# Patient Record
Sex: FEMALE | Race: BLACK OR AFRICAN AMERICAN | NOT HISPANIC OR LATINO | URBAN - METROPOLITAN AREA
[De-identification: names, ages, dates, MRNs, and addresses within clinical notes are randomized per-mention and may not be internally consistent; named-entity substitution may affect disease eponyms.]

---

## 2020-07-06 ENCOUNTER — OFFICE VISIT (OUTPATIENT)
Dept: URBAN - METROPOLITAN AREA CLINIC 25 | Facility: CLINIC | Age: 75
End: 2020-07-06
Payer: MEDICARE

## 2020-07-06 DIAGNOSIS — E03.9 HYPOTHYROIDISM, UNSPECIFIED TYPE: ICD-10-CM

## 2020-07-06 DIAGNOSIS — R14.0 ABDOMINAL BLOATING: ICD-10-CM

## 2020-07-06 DIAGNOSIS — R10.13 EPIGASTRIC PAIN: ICD-10-CM

## 2020-07-06 DIAGNOSIS — I10 HYPERTENSION, UNSPECIFIED TYPE: ICD-10-CM

## 2020-07-06 PROCEDURE — G8427 DOCREV CUR MEDS BY ELIG CLIN: HCPCS | Performed by: INTERNAL MEDICINE

## 2020-07-06 PROCEDURE — G9906 PT RECV TBCO CESS INTERV: HCPCS | Performed by: INTERNAL MEDICINE

## 2020-07-06 PROCEDURE — 3017F COLORECTAL CA SCREEN DOC REV: CPT | Performed by: INTERNAL MEDICINE

## 2020-07-06 PROCEDURE — 4004F PT TOBACCO SCREEN RCVD TLK: CPT | Performed by: INTERNAL MEDICINE

## 2020-07-06 PROCEDURE — G9902 PT SCRN TBCO AND ID AS USER: HCPCS | Performed by: INTERNAL MEDICINE

## 2020-07-06 PROCEDURE — 82274 ASSAY TEST FOR BLOOD FECAL: CPT | Performed by: INTERNAL MEDICINE

## 2020-07-06 PROCEDURE — G8417 CALC BMI ABV UP PARAM F/U: HCPCS | Performed by: INTERNAL MEDICINE

## 2020-07-06 PROCEDURE — 99204 OFFICE O/P NEW MOD 45 MIN: CPT | Performed by: INTERNAL MEDICINE

## 2020-07-06 RX ORDER — CELECOXIB 200 MG/1
CAPSULE ORAL
Qty: 0 | Refills: 0 | Status: ACTIVE | COMMUNITY
Start: 1900-01-01

## 2020-07-06 RX ORDER — GABAPENTIN 600 MG/1
TAKE 1 TABLET (600 MG) BY ORAL ROUTE 3 TIMES PER DAY TABLET, FILM COATED ORAL
Qty: 0 | Refills: 0 | Status: ACTIVE | COMMUNITY
Start: 1900-01-01

## 2020-07-06 RX ORDER — LEVOTHYROXINE SODIUM 75 UG/1
TABLET ORAL
Qty: 0 | Refills: 0 | Status: ACTIVE | COMMUNITY
Start: 1900-01-01

## 2020-07-06 RX ORDER — DEXLANSOPRAZOLE 60 MG/1
TAKE 1 CAPSULE (60 MG) BY ORAL ROUTE ONCE DAILY CAPSULE, DELAYED RELEASE ORAL 1
Qty: 0 | Refills: 0 | Status: ACTIVE | COMMUNITY
Start: 1900-01-01

## 2020-07-06 RX ORDER — CYCLOSPORINE 0.5 MG/ML
INSTILL 1 DROP INTO AFFECTED EYE(S) BY OPHTHALMIC ROUTE 2 TIMES PER DAY EMULSION OPHTHALMIC 2
Qty: 1 | Refills: 0 | Status: ACTIVE | COMMUNITY
Start: 1900-01-01

## 2020-07-06 RX ORDER — IBUPROFEN 800 MG/1
TAKE 1 TABLET (800 MG) BY ORAL ROUTE 3 TIMES PER DAY WITH FOOD TABLET ORAL
Qty: 0 | Refills: 0 | Status: ACTIVE | COMMUNITY
Start: 1900-01-01

## 2020-07-06 RX ORDER — OXYCODONE AND ACETAMINOPHEN 7.5; 325 MG/1; MG/1
TABLET ORAL
Qty: 0 | Refills: 0 | Status: ACTIVE | COMMUNITY
Start: 1900-01-01

## 2020-07-06 RX ORDER — CARISOPRODOL 350 MG/1
TAKE 1 TABLET (350 MG) BY ORAL ROUTE 3 TIMES PER DAY AND AT BEDTIME TABLET ORAL
Qty: 0 | Refills: 0 | Status: ACTIVE | COMMUNITY
Start: 1900-01-01

## 2020-07-06 RX ORDER — LETROZOLE TABLETS 2.5 MG/1
TAKE 1 TABLET (2.5 MG) BY ORAL ROUTE ONCE DAILY TABLET, FILM COATED ORAL 1
Qty: 0 | Refills: 0 | Status: ACTIVE | COMMUNITY
Start: 1900-01-01

## 2020-07-06 RX ORDER — NAPROXEN 500 MG/1
TAKE 1 TABLET (500 MG) BY ORAL ROUTE 2 TIMES PER DAY WITH FOOD TABLET ORAL 2
Qty: 0 | Refills: 0 | Status: ACTIVE | COMMUNITY
Start: 1900-01-01

## 2020-07-06 RX ORDER — LEVOTHYROXINE SODIUM 0.09 MG/1
TAKE 1 TABLET (88 MCG) BY ORAL ROUTE ONCE DAILY TABLET ORAL 1
Qty: 0 | Refills: 0 | Status: ACTIVE | COMMUNITY
Start: 1900-01-01

## 2020-07-06 RX ORDER — ERGOCALCIFEROL 1.25 MG/1
CAPSULE ORAL
Qty: 0 | Refills: 0 | Status: ACTIVE | COMMUNITY
Start: 1900-01-01

## 2020-07-06 RX ORDER — AMLODIPINE BESYLATE 5 MG/1
TAKE 1 TABLET (5 MG) BY ORAL ROUTE ONCE DAILY TABLET ORAL 1
Qty: 0 | Refills: 0 | Status: ACTIVE | COMMUNITY
Start: 1900-01-01

## 2020-07-06 RX ORDER — FENTANYL 75 UG/H
APPLY 1 PATCH (75 MCG/HOUR) BY TRANSDERMAL ROUTE EVERY 72 HOURS PATCH, EXTENDED RELEASE TRANSDERMAL
Qty: 0 | Refills: 0 | Status: ACTIVE | COMMUNITY
Start: 1900-01-01

## 2020-07-06 NOTE — PHYSICAL EXAM CHEST:
no lesions,  no deformities,  no traumatic injuries,  no significant scars are present,  chest wall non-tender,  no masses present,  tactile fremitus is normal, breathing is unlabored without accessory muscle use,normal breath sounds
Warm

## 2020-07-08 ENCOUNTER — OFFICE VISIT (OUTPATIENT)
Dept: URBAN - METROPOLITAN AREA CLINIC 15 | Facility: CLINIC | Age: 75
End: 2020-07-08

## 2020-07-14 LAB
AMYLASE: 118
LIPASE: 74

## 2020-07-30 ENCOUNTER — OFFICE VISIT (OUTPATIENT)
Dept: URBAN - METROPOLITAN AREA SURGERY CENTER 16 | Facility: SURGERY CENTER | Age: 75
End: 2020-07-30
Payer: MEDICARE

## 2020-07-30 ENCOUNTER — CLAIMS CREATED FROM THE CLAIM WINDOW (OUTPATIENT)
Dept: URBAN - METROPOLITAN AREA CLINIC 4 | Facility: CLINIC | Age: 75
End: 2020-07-30
Payer: MEDICARE

## 2020-07-30 DIAGNOSIS — T47.8X5A ADVERSE EFFECT OF OTHER AGENTS PRIMARILY AFFECTING GASTROINTESTINAL SYSTEM, INITIAL ENCOUNTER: ICD-10-CM

## 2020-07-30 DIAGNOSIS — K31.7 BENIGN GASTRIC POLYP: ICD-10-CM

## 2020-07-30 PROCEDURE — 88305 TISSUE EXAM BY PATHOLOGIST: CPT | Performed by: PATHOLOGY

## 2020-07-30 PROCEDURE — G8907 PT DOC NO EVENTS ON DISCHARG: HCPCS | Performed by: INTERNAL MEDICINE

## 2020-07-30 PROCEDURE — 88312 SPECIAL STAINS GROUP 1: CPT | Performed by: PATHOLOGY

## 2020-07-30 PROCEDURE — 43239 EGD BIOPSY SINGLE/MULTIPLE: CPT | Performed by: INTERNAL MEDICINE

## 2020-07-30 RX ORDER — CYCLOSPORINE 0.5 MG/ML
INSTILL 1 DROP INTO AFFECTED EYE(S) BY OPHTHALMIC ROUTE 2 TIMES PER DAY EMULSION OPHTHALMIC 2
Qty: 1 | Refills: 0 | Status: ACTIVE | COMMUNITY
Start: 1900-01-01 | End: 1900-01-01

## 2020-07-30 RX ORDER — IBUPROFEN 800 MG/1
TAKE 1 TABLET (800 MG) BY ORAL ROUTE 3 TIMES PER DAY WITH FOOD TABLET ORAL
Qty: 0 | Refills: 0 | Status: ACTIVE | COMMUNITY
Start: 1900-01-01 | End: 1900-01-01

## 2020-07-30 RX ORDER — ERGOCALCIFEROL 1.25 MG/1
CAPSULE ORAL
Qty: 0 | Refills: 0 | Status: ACTIVE | COMMUNITY
Start: 1900-01-01 | End: 1900-01-01

## 2020-07-30 RX ORDER — FENTANYL 75 UG/H
APPLY 1 PATCH (75 MCG/HOUR) BY TRANSDERMAL ROUTE EVERY 72 HOURS PATCH, EXTENDED RELEASE TRANSDERMAL
Qty: 0 | Refills: 0 | Status: ACTIVE | COMMUNITY
Start: 1900-01-01 | End: 1900-01-01

## 2020-07-30 RX ORDER — NAPROXEN 500 MG/1
TAKE 1 TABLET (500 MG) BY ORAL ROUTE 2 TIMES PER DAY WITH FOOD TABLET ORAL 2
Qty: 0 | Refills: 0 | Status: ACTIVE | COMMUNITY
Start: 1900-01-01 | End: 1900-01-01

## 2020-07-30 RX ORDER — OXYCODONE AND ACETAMINOPHEN 7.5; 325 MG/1; MG/1
TABLET ORAL
Qty: 0 | Refills: 0 | Status: ACTIVE | COMMUNITY
Start: 1900-01-01 | End: 1900-01-01

## 2020-07-30 RX ORDER — DEXLANSOPRAZOLE 60 MG/1
TAKE 1 CAPSULE (60 MG) BY ORAL ROUTE ONCE DAILY CAPSULE, DELAYED RELEASE ORAL 1
Qty: 0 | Refills: 0 | Status: ACTIVE | COMMUNITY
Start: 1900-01-01 | End: 1900-01-01

## 2020-07-30 RX ORDER — AMLODIPINE BESYLATE 5 MG/1
TAKE 1 TABLET (5 MG) BY ORAL ROUTE ONCE DAILY TABLET ORAL 1
Qty: 0 | Refills: 0 | Status: ACTIVE | COMMUNITY
Start: 1900-01-01 | End: 1900-01-01

## 2020-07-30 RX ORDER — LEVOTHYROXINE SODIUM 75 UG/1
TABLET ORAL
Qty: 0 | Refills: 0 | Status: ACTIVE | COMMUNITY
Start: 1900-01-01 | End: 1900-01-01

## 2020-07-30 RX ORDER — CELECOXIB 200 MG/1
CAPSULE ORAL
Qty: 0 | Refills: 0 | Status: ACTIVE | COMMUNITY
Start: 1900-01-01 | End: 1900-01-01

## 2020-07-30 RX ORDER — GABAPENTIN 600 MG/1
TAKE 1 TABLET (600 MG) BY ORAL ROUTE 3 TIMES PER DAY TABLET, FILM COATED ORAL
Qty: 0 | Refills: 0 | Status: ACTIVE | COMMUNITY
Start: 1900-01-01 | End: 1900-01-01

## 2020-07-30 RX ORDER — LEVOTHYROXINE SODIUM 0.09 MG/1
TAKE 1 TABLET (88 MCG) BY ORAL ROUTE ONCE DAILY TABLET ORAL 1
Qty: 0 | Refills: 0 | Status: ACTIVE | COMMUNITY
Start: 1900-01-01 | End: 1900-01-01

## 2020-07-30 RX ORDER — LETROZOLE TABLETS 2.5 MG/1
TAKE 1 TABLET (2.5 MG) BY ORAL ROUTE ONCE DAILY TABLET, FILM COATED ORAL 1
Qty: 0 | Refills: 0 | Status: ACTIVE | COMMUNITY
Start: 1900-01-01 | End: 1900-01-01

## 2020-07-30 RX ORDER — CARISOPRODOL 350 MG/1
TAKE 1 TABLET (350 MG) BY ORAL ROUTE 3 TIMES PER DAY AND AT BEDTIME TABLET ORAL
Qty: 0 | Refills: 0 | Status: ACTIVE | COMMUNITY
Start: 1900-01-01 | End: 1900-01-01

## 2021-06-02 PROBLEM — 162864005: Status: ACTIVE | Noted: 2021-06-02

## 2021-06-02 PROBLEM — 414916001: Status: ACTIVE | Noted: 2021-06-02

## 2021-08-05 ENCOUNTER — LAB OUTSIDE AN ENCOUNTER (OUTPATIENT)
Dept: URBAN - METROPOLITAN AREA CLINIC 92 | Facility: CLINIC | Age: 76
End: 2021-08-05

## 2021-08-05 ENCOUNTER — OFFICE VISIT (OUTPATIENT)
Dept: URBAN - METROPOLITAN AREA CLINIC 92 | Facility: CLINIC | Age: 76
End: 2021-08-05
Payer: MEDICARE

## 2021-08-05 VITALS
HEART RATE: 85 BPM | SYSTOLIC BLOOD PRESSURE: 143 MMHG | DIASTOLIC BLOOD PRESSURE: 75 MMHG | HEIGHT: 61 IN | WEIGHT: 176 LBS | BODY MASS INDEX: 33.23 KG/M2 | TEMPERATURE: 98 F

## 2021-08-05 DIAGNOSIS — Z80.0 FAMILY HISTORY OF COLON CANCER: ICD-10-CM

## 2021-08-05 DIAGNOSIS — I25.10 CORONARY ARTERY DISEASE INVOLVING NATIVE CORONARY ARTERY OF NATIVE HEART WITHOUT ANGINA PECTORIS: ICD-10-CM

## 2021-08-05 DIAGNOSIS — R19.7 DIARRHEA, UNSPECIFIED TYPE: ICD-10-CM

## 2021-08-05 DIAGNOSIS — R12 HEARTBURN: ICD-10-CM

## 2021-08-05 DIAGNOSIS — K21.9 GASTROESOPHAGEAL REFLUX DISEASE, UNSPECIFIED WHETHER ESOPHAGITIS PRESENT: ICD-10-CM

## 2021-08-05 DIAGNOSIS — R11.0 NAUSEA: ICD-10-CM

## 2021-08-05 PROBLEM — 443502000: Status: ACTIVE | Noted: 2021-08-05

## 2021-08-05 PROCEDURE — 99204 OFFICE O/P NEW MOD 45 MIN: CPT | Performed by: INTERNAL MEDICINE

## 2021-08-05 RX ORDER — AMLODIPINE BESYLATE 5 MG/1
TAKE 1 TABLET (5 MG) BY ORAL ROUTE ONCE DAILY TABLET ORAL 1
Qty: 0 | Refills: 0 | Status: ACTIVE | COMMUNITY
Start: 1900-01-01

## 2021-08-05 RX ORDER — FENTANYL 75 UG/H
APPLY 1 PATCH (75 MCG/HOUR) BY TRANSDERMAL ROUTE EVERY 72 HOURS PATCH, EXTENDED RELEASE TRANSDERMAL
Qty: 0 | Refills: 0 | Status: ACTIVE | COMMUNITY
Start: 1900-01-01

## 2021-08-05 RX ORDER — ONDANSETRON HYDROCHLORIDE 4 MG/1
1 TABLET AS NEEDED TABLET, FILM COATED ORAL ONCE A DAY
Qty: 10 TABLET | Refills: 0 | OUTPATIENT
Start: 2021-08-05

## 2021-08-05 RX ORDER — NAPROXEN 500 MG/1
TAKE 1 TABLET (500 MG) BY ORAL ROUTE 2 TIMES PER DAY WITH FOOD TABLET ORAL 2
Qty: 0 | Refills: 0 | Status: ACTIVE | COMMUNITY
Start: 1900-01-01

## 2021-08-05 RX ORDER — LEVOTHYROXINE SODIUM 75 UG/1
TABLET ORAL
Qty: 0 | Refills: 0 | Status: ACTIVE | COMMUNITY
Start: 1900-01-01

## 2021-08-05 RX ORDER — LETROZOLE TABLETS 2.5 MG/1
TAKE 1 TABLET (2.5 MG) BY ORAL ROUTE ONCE DAILY TABLET, FILM COATED ORAL 1
Qty: 0 | Refills: 0 | Status: ACTIVE | COMMUNITY
Start: 1900-01-01

## 2021-08-05 RX ORDER — CYCLOSPORINE 0.5 MG/ML
INSTILL 1 DROP INTO AFFECTED EYE(S) BY OPHTHALMIC ROUTE 2 TIMES PER DAY EMULSION OPHTHALMIC 2
Qty: 1 | Refills: 0 | Status: ACTIVE | COMMUNITY
Start: 1900-01-01

## 2021-08-05 RX ORDER — CELECOXIB 200 MG/1
CAPSULE ORAL
Qty: 0 | Refills: 0 | Status: ACTIVE | COMMUNITY
Start: 1900-01-01

## 2021-08-05 RX ORDER — OXYCODONE AND ACETAMINOPHEN 7.5; 325 MG/1; MG/1
TABLET ORAL
Qty: 0 | Refills: 0 | Status: ACTIVE | COMMUNITY
Start: 1900-01-01

## 2021-08-05 RX ORDER — IBUPROFEN 800 MG/1
TAKE 1 TABLET (800 MG) BY ORAL ROUTE 3 TIMES PER DAY WITH FOOD TABLET ORAL
Qty: 0 | Refills: 0 | Status: ACTIVE | COMMUNITY
Start: 1900-01-01

## 2021-08-05 RX ORDER — LEVOTHYROXINE SODIUM 0.09 MG/1
TAKE 1 TABLET (88 MCG) BY ORAL ROUTE ONCE DAILY TABLET ORAL 1
Qty: 0 | Refills: 0 | Status: ACTIVE | COMMUNITY
Start: 1900-01-01

## 2021-08-05 RX ORDER — CARISOPRODOL 350 MG/1
TAKE 1 TABLET (350 MG) BY ORAL ROUTE 3 TIMES PER DAY AND AT BEDTIME TABLET ORAL
Qty: 0 | Refills: 0 | Status: ACTIVE | COMMUNITY
Start: 1900-01-01

## 2021-08-05 RX ORDER — OMEPRAZOLE 40 MG/1
1 CAPSULE 30 MINUTES BEFORE MORNING MEAL CAPSULE, DELAYED RELEASE ORAL ONCE A DAY
Qty: 30 | Refills: 0 | OUTPATIENT
Start: 2021-08-05

## 2021-08-05 RX ORDER — ERGOCALCIFEROL 1.25 MG/1
CAPSULE ORAL
Qty: 0 | Refills: 0 | Status: ACTIVE | COMMUNITY
Start: 1900-01-01

## 2021-08-05 RX ORDER — GABAPENTIN 600 MG/1
TAKE 1 TABLET (600 MG) BY ORAL ROUTE 3 TIMES PER DAY TABLET, FILM COATED ORAL
Qty: 0 | Refills: 0 | Status: ACTIVE | COMMUNITY
Start: 1900-01-01

## 2021-08-05 NOTE — HPI-TODAY'S VISIT:
This is a 76-year-old female referred by Dr. Ben Bautista for a GI eval of diarrhea, heartburn and nausea.  Upon review of the chart, patient has been seeing my partner Dr. Kenneth Martínez and last saw him on July 6 2020 complaining of abdominal pain in the epigastrium with some bloating a few times a week.  At that time patient denied dysphagia.  Dr. Martínez ordered an upper endoscopy and this was done on July 30, 2020 and revealed a normal-appearing esophagus, some mild gastric erythema.  Biopsies were taken and revealed fundic gland polyp of the stomach but no H. pylori or other abnormality seen of the stomach.  Previous to this she had an EGD with similar findings in 2018 but due to dysphagia Dr. Martínez did a [place] dilation with 46 Bulgarian dilator empirically.  Patient had a colonoscopy on April 27, 2017 with Dr. Martínez this revealed internal hemorrhoids as well as 2 polyps that were removed.  Dr. Martínez did biopsies to rule out colitis and this was negative.  The polyps were adenomas.  He recommended a repeat colonoscopy in 5 years or May 2022.  It appears that patient has had a CT scan done for abdominal pain in 2013 that showed a uterine fibroid. Pt could not get in to see Dr Martínez so made an appt to see me. Pt still has upper abd discomfort like she did last July. Pt says she ate some "fake hamburger" a week ago and started to have some diarrhea since then. Pt is going every times she eats, no appetite. NO temps. NO recent travel. Pt had an ingrown toenail so she took some abx then. Pt had cardiac stent in 2019- did not have an MI. Recently had cp so went to ER and saw  her cardiologist and heart was fine, they told her to see GI for GERD. Pt has not been on her PPI.

## 2021-08-06 ENCOUNTER — LAB OUTSIDE AN ENCOUNTER (OUTPATIENT)
Dept: URBAN - METROPOLITAN AREA CLINIC 96 | Facility: CLINIC | Age: 76
End: 2021-08-06

## 2021-08-11 LAB — GASTROINTESTINAL PATHOGEN: (no result)

## 2021-08-12 ENCOUNTER — TELEPHONE ENCOUNTER (OUTPATIENT)
Dept: URBAN - METROPOLITAN AREA CLINIC 92 | Facility: CLINIC | Age: 76
End: 2021-08-12

## 2021-08-12 RX ORDER — AZITHROMYCIN MONOHYDRATE 500 MG/1
1 TABLET TABLET, FILM COATED ORAL ONCE A DAY
Qty: 3 TABLET | Refills: 0 | OUTPATIENT
Start: 2021-08-12 | End: 2021-08-15

## 2021-09-13 ENCOUNTER — OFFICE VISIT (OUTPATIENT)
Dept: URBAN - METROPOLITAN AREA CLINIC 25 | Facility: CLINIC | Age: 76
End: 2021-09-13
Payer: MEDICARE

## 2021-09-13 VITALS
WEIGHT: 178 LBS | SYSTOLIC BLOOD PRESSURE: 118 MMHG | DIASTOLIC BLOOD PRESSURE: 60 MMHG | HEART RATE: 78 BPM | BODY MASS INDEX: 33.61 KG/M2 | HEIGHT: 61 IN | TEMPERATURE: 97.7 F

## 2021-09-13 DIAGNOSIS — E03.9 HYPOTHYROIDISM, UNSPECIFIED TYPE: ICD-10-CM

## 2021-09-13 DIAGNOSIS — R19.4 CHANGE IN BOWEL HABITS: ICD-10-CM

## 2021-09-13 DIAGNOSIS — Z86.010 HISTORY OF COLONIC POLYPS: ICD-10-CM

## 2021-09-13 DIAGNOSIS — Z85.3 HISTORY OF BREAST CANCER: ICD-10-CM

## 2021-09-13 DIAGNOSIS — R19.7 DIARRHEA, UNSPECIFIED TYPE: ICD-10-CM

## 2021-09-13 DIAGNOSIS — E66.9 OBESITY (BMI 30-39.9): ICD-10-CM

## 2021-09-13 DIAGNOSIS — I10 HYPERTENSION, UNSPECIFIED TYPE: ICD-10-CM

## 2021-09-13 DIAGNOSIS — R10.84 GENERALIZED ABDOMINAL PAIN: ICD-10-CM

## 2021-09-13 PROBLEM — 429087003: Status: ACTIVE | Noted: 2021-09-13

## 2021-09-13 PROBLEM — 428283002: Status: ACTIVE | Noted: 2021-09-13

## 2021-09-13 PROCEDURE — 99204 OFFICE O/P NEW MOD 45 MIN: CPT | Performed by: INTERNAL MEDICINE

## 2021-09-13 RX ORDER — CYCLOSPORINE 0.5 MG/ML
INSTILL 1 DROP INTO AFFECTED EYE(S) BY OPHTHALMIC ROUTE 2 TIMES PER DAY EMULSION OPHTHALMIC 2
Qty: 1 | Refills: 0 | Status: ON HOLD | COMMUNITY
Start: 1900-01-01

## 2021-09-13 RX ORDER — AMLODIPINE BESYLATE 5 MG/1
TAKE 1 TABLET (5 MG) BY ORAL ROUTE ONCE DAILY TABLET ORAL 1
Qty: 0 | Refills: 0 | Status: ACTIVE | COMMUNITY
Start: 1900-01-01

## 2021-09-13 RX ORDER — GABAPENTIN 600 MG/1
TAKE 1 TABLET (600 MG) BY ORAL ROUTE 3 TIMES PER DAY TABLET, FILM COATED ORAL
Qty: 0 | Refills: 0 | Status: ACTIVE | COMMUNITY
Start: 1900-01-01

## 2021-09-13 RX ORDER — NAPROXEN 500 MG/1
TAKE 1 TABLET (500 MG) BY ORAL ROUTE 2 TIMES PER DAY WITH FOOD TABLET ORAL 2
Qty: 0 | Refills: 0 | Status: ACTIVE | COMMUNITY
Start: 1900-01-01

## 2021-09-13 RX ORDER — LEVOTHYROXINE SODIUM 75 UG/1
TABLET ORAL
Qty: 0 | Refills: 0 | Status: ON HOLD | COMMUNITY
Start: 1900-01-01

## 2021-09-13 RX ORDER — ERGOCALCIFEROL 1.25 MG/1
CAPSULE ORAL
Qty: 0 | Refills: 0 | Status: ON HOLD | COMMUNITY
Start: 1900-01-01

## 2021-09-13 RX ORDER — CELECOXIB 200 MG/1
CAPSULE ORAL
Qty: 0 | Refills: 0 | Status: ACTIVE | COMMUNITY
Start: 1900-01-01

## 2021-09-13 RX ORDER — IBUPROFEN 800 MG/1
TAKE 1 TABLET (800 MG) BY ORAL ROUTE 3 TIMES PER DAY WITH FOOD TABLET ORAL
Qty: 0 | Refills: 0 | Status: ACTIVE | COMMUNITY
Start: 1900-01-01

## 2021-09-13 RX ORDER — FENTANYL 75 UG/H
APPLY 1 PATCH (75 MCG/HOUR) BY TRANSDERMAL ROUTE EVERY 72 HOURS PATCH, EXTENDED RELEASE TRANSDERMAL
Qty: 0 | Refills: 0 | Status: ACTIVE | COMMUNITY
Start: 1900-01-01

## 2021-09-13 RX ORDER — LEVOTHYROXINE SODIUM 0.09 MG/1
TAKE 1 TABLET (88 MCG) BY ORAL ROUTE ONCE DAILY TABLET ORAL 1
Qty: 0 | Refills: 0 | Status: ACTIVE | COMMUNITY
Start: 1900-01-01

## 2021-09-13 RX ORDER — OXYCODONE AND ACETAMINOPHEN 7.5; 325 MG/1; MG/1
TABLET ORAL
Qty: 0 | Refills: 0 | Status: ON HOLD | COMMUNITY
Start: 1900-01-01

## 2021-09-13 RX ORDER — ONDANSETRON HYDROCHLORIDE 4 MG/1
1 TABLET AS NEEDED TABLET, FILM COATED ORAL ONCE A DAY
Qty: 10 TABLET | Refills: 0 | Status: ACTIVE | COMMUNITY
Start: 2021-08-05

## 2021-09-13 RX ORDER — OMEPRAZOLE 40 MG/1
1 CAPSULE 30 MINUTES BEFORE MORNING MEAL CAPSULE, DELAYED RELEASE ORAL ONCE A DAY
Qty: 30 | Refills: 0 | Status: ON HOLD | COMMUNITY
Start: 2021-08-05

## 2021-09-13 RX ORDER — LETROZOLE TABLETS 2.5 MG/1
TAKE 1 TABLET (2.5 MG) BY ORAL ROUTE ONCE DAILY TABLET, FILM COATED ORAL 1
Qty: 0 | Refills: 0 | Status: ACTIVE | COMMUNITY
Start: 1900-01-01

## 2021-09-13 RX ORDER — CARISOPRODOL 350 MG/1
TAKE 1 TABLET (350 MG) BY ORAL ROUTE 3 TIMES PER DAY AND AT BEDTIME TABLET ORAL
Qty: 0 | Refills: 0 | Status: ACTIVE | COMMUNITY
Start: 1900-01-01

## 2021-09-21 PROBLEM — 62315008: Status: ACTIVE | Noted: 2021-09-21

## 2021-09-23 ENCOUNTER — OFFICE VISIT (OUTPATIENT)
Dept: URBAN - METROPOLITAN AREA SURGERY CENTER 16 | Facility: SURGERY CENTER | Age: 76
End: 2021-09-23

## 2021-10-28 ENCOUNTER — OFFICE VISIT (OUTPATIENT)
Dept: URBAN - METROPOLITAN AREA SURGERY CENTER 16 | Facility: SURGERY CENTER | Age: 76
End: 2021-10-28
Payer: MEDICARE

## 2021-10-28 DIAGNOSIS — R10.84 ABDOMINAL CRAMPING, GENERALIZED: ICD-10-CM

## 2021-10-28 DIAGNOSIS — R19.4 ALTERATION IN BOWEL ELIMINATION: ICD-10-CM

## 2021-10-28 PROCEDURE — 45378 DIAGNOSTIC COLONOSCOPY: CPT | Performed by: INTERNAL MEDICINE

## 2021-10-28 PROCEDURE — G8907 PT DOC NO EVENTS ON DISCHARG: HCPCS | Performed by: INTERNAL MEDICINE

## 2022-03-21 ENCOUNTER — OFFICE VISIT (OUTPATIENT)
Dept: URBAN - METROPOLITAN AREA CLINIC 25 | Facility: CLINIC | Age: 77
End: 2022-03-21
Payer: MEDICARE

## 2022-03-21 VITALS
WEIGHT: 178.6 LBS | TEMPERATURE: 97 F | HEART RATE: 60 BPM | SYSTOLIC BLOOD PRESSURE: 121 MMHG | DIASTOLIC BLOOD PRESSURE: 69 MMHG | BODY MASS INDEX: 33.72 KG/M2 | HEIGHT: 61 IN

## 2022-03-21 DIAGNOSIS — Z86.010 HX OF COLONIC POLYPS: ICD-10-CM

## 2022-03-21 DIAGNOSIS — K59.1 FUNCTIONAL DIARRHEA: ICD-10-CM

## 2022-03-21 DIAGNOSIS — E66.9 OBESITY (BMI 30-39.9): ICD-10-CM

## 2022-03-21 DIAGNOSIS — E03.9 HYPOTHYROIDISM, UNSPECIFIED TYPE: ICD-10-CM

## 2022-03-21 DIAGNOSIS — R11.2 NAUSEA AND VOMITING, UNSPECIFIED VOMITING TYPE: ICD-10-CM

## 2022-03-21 DIAGNOSIS — I10 HYPERTENSION, UNSPECIFIED TYPE: ICD-10-CM

## 2022-03-21 DIAGNOSIS — R13.10 DYSPHAGIA, UNSPECIFIED TYPE: ICD-10-CM

## 2022-03-21 DIAGNOSIS — R10.84 GENERALIZED ABDOMINAL PAIN: ICD-10-CM

## 2022-03-21 PROBLEM — 162864005: Status: ACTIVE | Noted: 2021-09-13

## 2022-03-21 PROBLEM — 40739000: Status: ACTIVE | Noted: 2022-03-21

## 2022-03-21 PROBLEM — 38341003: Status: ACTIVE | Noted: 2021-09-13

## 2022-03-21 PROBLEM — 428283002: Status: ACTIVE | Noted: 2022-03-21

## 2022-03-21 PROBLEM — 40930008: Status: ACTIVE | Noted: 2021-09-13

## 2022-03-21 PROCEDURE — 99213 OFFICE O/P EST LOW 20 MIN: CPT | Performed by: INTERNAL MEDICINE

## 2022-03-21 RX ORDER — AMLODIPINE BESYLATE 5 MG/1
TAKE 1 TABLET (5 MG) BY ORAL ROUTE ONCE DAILY TABLET ORAL 1
Qty: 0 | Refills: 0 | Status: ACTIVE | COMMUNITY
Start: 1900-01-01

## 2022-03-21 RX ORDER — ERGOCALCIFEROL 1.25 MG/1
CAPSULE ORAL
Qty: 0 | Refills: 0 | Status: ON HOLD | COMMUNITY
Start: 1900-01-01

## 2022-03-21 RX ORDER — IBUPROFEN 800 MG/1
TAKE 1 TABLET (800 MG) BY ORAL ROUTE 3 TIMES PER DAY WITH FOOD TABLET ORAL
Qty: 0 | Refills: 0 | Status: ACTIVE | COMMUNITY
Start: 1900-01-01

## 2022-03-21 RX ORDER — GABAPENTIN 600 MG/1
TAKE 1 TABLET (600 MG) BY ORAL ROUTE 3 TIMES PER DAY TABLET, FILM COATED ORAL
Qty: 0 | Refills: 0 | Status: ACTIVE | COMMUNITY
Start: 1900-01-01

## 2022-03-21 RX ORDER — OMEPRAZOLE 40 MG/1
1 CAPSULE 30 MINUTES BEFORE MORNING MEAL CAPSULE, DELAYED RELEASE ORAL ONCE A DAY
Qty: 30 | Refills: 0 | Status: ON HOLD | COMMUNITY
Start: 2021-08-05

## 2022-03-21 RX ORDER — LETROZOLE TABLETS 2.5 MG/1
TAKE 1 TABLET (2.5 MG) BY ORAL ROUTE ONCE DAILY TABLET, FILM COATED ORAL 1
Qty: 0 | Refills: 0 | Status: ACTIVE | COMMUNITY
Start: 1900-01-01

## 2022-03-21 RX ORDER — LEVOTHYROXINE SODIUM 75 UG/1
TABLET ORAL
Qty: 0 | Refills: 0 | Status: ON HOLD | COMMUNITY
Start: 1900-01-01

## 2022-03-21 RX ORDER — NAPROXEN 500 MG/1
TAKE 1 TABLET (500 MG) BY ORAL ROUTE 2 TIMES PER DAY WITH FOOD TABLET ORAL 2
Qty: 0 | Refills: 0 | Status: ACTIVE | COMMUNITY
Start: 1900-01-01

## 2022-03-21 RX ORDER — OXYCODONE AND ACETAMINOPHEN 7.5; 325 MG/1; MG/1
TABLET ORAL
Qty: 0 | Refills: 0 | Status: ON HOLD | COMMUNITY
Start: 1900-01-01

## 2022-03-21 RX ORDER — CYCLOSPORINE 0.5 MG/ML
INSTILL 1 DROP INTO AFFECTED EYE(S) BY OPHTHALMIC ROUTE 2 TIMES PER DAY EMULSION OPHTHALMIC 2
Qty: 1 | Refills: 0 | Status: ON HOLD | COMMUNITY
Start: 1900-01-01

## 2022-03-21 RX ORDER — CELECOXIB 200 MG/1
CAPSULE ORAL
Qty: 0 | Refills: 0 | Status: ACTIVE | COMMUNITY
Start: 1900-01-01

## 2022-03-21 RX ORDER — LEVOTHYROXINE SODIUM 0.09 MG/1
TAKE 1 TABLET (88 MCG) BY ORAL ROUTE ONCE DAILY TABLET ORAL 1
Qty: 0 | Refills: 0 | Status: ACTIVE | COMMUNITY
Start: 1900-01-01

## 2022-03-21 RX ORDER — FENTANYL 75 UG/H
APPLY 1 PATCH (75 MCG/HOUR) BY TRANSDERMAL ROUTE EVERY 72 HOURS PATCH, EXTENDED RELEASE TRANSDERMAL
Qty: 0 | Refills: 0 | Status: ACTIVE | COMMUNITY
Start: 1900-01-01

## 2022-03-21 RX ORDER — ONDANSETRON HYDROCHLORIDE 4 MG/1
1 TABLET AS NEEDED TABLET, FILM COATED ORAL ONCE A DAY
Qty: 10 TABLET | Refills: 0 | Status: ACTIVE | COMMUNITY
Start: 2021-08-05

## 2022-03-21 RX ORDER — CARISOPRODOL 350 MG/1
TAKE 1 TABLET (350 MG) BY ORAL ROUTE 3 TIMES PER DAY AND AT BEDTIME TABLET ORAL
Qty: 0 | Refills: 0 | Status: ACTIVE | COMMUNITY
Start: 1900-01-01

## 2022-04-15 ENCOUNTER — OFFICE VISIT (OUTPATIENT)
Dept: URBAN - METROPOLITAN AREA CLINIC 17 | Facility: CLINIC | Age: 77
End: 2022-04-15

## 2023-05-18 ENCOUNTER — OFFICE VISIT (OUTPATIENT)
Dept: URBAN - METROPOLITAN AREA CLINIC 92 | Facility: CLINIC | Age: 78
End: 2023-05-18
Payer: MEDICARE

## 2023-05-18 ENCOUNTER — WEB ENCOUNTER (OUTPATIENT)
Dept: URBAN - METROPOLITAN AREA CLINIC 92 | Facility: CLINIC | Age: 78
End: 2023-05-18

## 2023-05-18 VITALS
TEMPERATURE: 96.8 F | DIASTOLIC BLOOD PRESSURE: 79 MMHG | HEART RATE: 74 BPM | WEIGHT: 152.3 LBS | HEIGHT: 61 IN | BODY MASS INDEX: 28.75 KG/M2 | SYSTOLIC BLOOD PRESSURE: 132 MMHG

## 2023-05-18 DIAGNOSIS — R10.84 GENERALIZED ABDOMINAL PAIN: ICD-10-CM

## 2023-05-18 DIAGNOSIS — R19.7 ACUTE DIARRHEA: ICD-10-CM

## 2023-05-18 PROCEDURE — 99213 OFFICE O/P EST LOW 20 MIN: CPT

## 2023-05-18 RX ORDER — ONDANSETRON HYDROCHLORIDE 4 MG/1
1 TABLET AS NEEDED TABLET, FILM COATED ORAL ONCE A DAY
Qty: 10 TABLET | Refills: 0 | Status: ACTIVE | COMMUNITY
Start: 2021-08-05

## 2023-05-18 RX ORDER — LEVOTHYROXINE SODIUM 75 UG/1
TABLET ORAL
Qty: 0 | Refills: 0 | Status: ON HOLD | COMMUNITY
Start: 1900-01-01

## 2023-05-18 RX ORDER — LEVOTHYROXINE SODIUM 0.09 MG/1
TAKE 1 TABLET (88 MCG) BY ORAL ROUTE ONCE DAILY TABLET ORAL 1
Qty: 0 | Refills: 0 | Status: ACTIVE | COMMUNITY
Start: 1900-01-01

## 2023-05-18 RX ORDER — ERGOCALCIFEROL 1.25 MG/1
CAPSULE ORAL
Qty: 0 | Refills: 0 | Status: ON HOLD | COMMUNITY
Start: 1900-01-01

## 2023-05-18 RX ORDER — FENTANYL 75 UG/H
APPLY 1 PATCH (75 MCG/HOUR) BY TRANSDERMAL ROUTE EVERY 72 HOURS PATCH, EXTENDED RELEASE TRANSDERMAL
Qty: 0 | Refills: 0 | Status: ACTIVE | COMMUNITY
Start: 1900-01-01

## 2023-05-18 RX ORDER — CYCLOSPORINE 0.5 MG/ML
INSTILL 1 DROP INTO AFFECTED EYE(S) BY OPHTHALMIC ROUTE 2 TIMES PER DAY EMULSION OPHTHALMIC 2
Qty: 1 | Refills: 0 | Status: ACTIVE | COMMUNITY
Start: 1900-01-01

## 2023-05-18 RX ORDER — GABAPENTIN 600 MG/1
TAKE 1 TABLET (600 MG) BY ORAL ROUTE 3 TIMES PER DAY TABLET, FILM COATED ORAL
Qty: 0 | Refills: 0 | Status: ACTIVE | COMMUNITY
Start: 1900-01-01

## 2023-05-18 RX ORDER — LETROZOLE TABLETS 2.5 MG/1
TAKE 1 TABLET (2.5 MG) BY ORAL ROUTE ONCE DAILY TABLET, FILM COATED ORAL 1
Qty: 0 | Refills: 0 | Status: ACTIVE | COMMUNITY
Start: 1900-01-01

## 2023-05-18 RX ORDER — IBUPROFEN 800 MG/1
TAKE 1 TABLET (800 MG) BY ORAL ROUTE 3 TIMES PER DAY WITH FOOD TABLET ORAL
Qty: 0 | Refills: 0 | Status: ACTIVE | COMMUNITY
Start: 1900-01-01

## 2023-05-18 RX ORDER — OXYCODONE AND ACETAMINOPHEN 7.5; 325 MG/1; MG/1
TABLET ORAL
Qty: 0 | Refills: 0 | Status: ACTIVE | COMMUNITY
Start: 1900-01-01

## 2023-05-18 RX ORDER — NAPROXEN 500 MG/1
TAKE 1 TABLET (500 MG) BY ORAL ROUTE 2 TIMES PER DAY WITH FOOD TABLET ORAL 2
Qty: 0 | Refills: 0 | Status: ACTIVE | COMMUNITY
Start: 1900-01-01

## 2023-05-18 RX ORDER — CELECOXIB 200 MG/1
CAPSULE ORAL
Qty: 0 | Refills: 0 | Status: ACTIVE | COMMUNITY
Start: 1900-01-01

## 2023-05-18 RX ORDER — CARISOPRODOL 350 MG/1
TAKE 1 TABLET (350 MG) BY ORAL ROUTE 3 TIMES PER DAY AND AT BEDTIME TABLET ORAL
Qty: 0 | Refills: 0 | Status: ACTIVE | COMMUNITY
Start: 1900-01-01

## 2023-05-18 RX ORDER — AMLODIPINE BESYLATE 5 MG/1
TAKE 1 TABLET (5 MG) BY ORAL ROUTE ONCE DAILY TABLET ORAL 1
Qty: 0 | Refills: 0 | Status: ACTIVE | COMMUNITY
Start: 1900-01-01

## 2023-05-18 RX ORDER — OMEPRAZOLE 40 MG/1
1 CAPSULE 30 MINUTES BEFORE MORNING MEAL CAPSULE, DELAYED RELEASE ORAL ONCE A DAY
Qty: 30 | Refills: 0 | Status: ON HOLD | COMMUNITY
Start: 2021-08-05

## 2023-05-18 NOTE — HPI-TODAY'S VISIT:
78-year-old female presents today due to abdominal pain.  She was seen in March 2022 due to diarrhea and abdominal pain.  Her last colonoscopy was 10- and this demonstrated internal hemorrhoids, diverticula in the entire colon no specimens collected.  She had an upper endoscopy in July 2020 this demonstrated chronic gastritis otherwise no abnormalities She did have a stool study in August 2021 and this detected E. coli Currently states she started to have abd pain and diarrhea that started a few weeks ago. She has generalized abd pain after eating. Abd pain is better after having a BM. She has no melena, hematochezia. Has lost some weight due to not eating. Denies recent travel, sick contacts, No new meds or abx, no recent hospitalizations. Denies fevers, chills. Has nausea and one episodes of vomiting last week no hematemesis.

## 2023-05-18 NOTE — PHYSICAL EXAM GASTROINTESTINAL
Abdomen,  soft, nontender, nondistended,  no guarding or rigidity,  no masses palpable,  normal bowel sounds Liver and Spleen,no hepatosplenomegaly Rectal Chaperone present rectal swab taken

## 2023-05-19 ENCOUNTER — TELEPHONE ENCOUNTER (OUTPATIENT)
Dept: URBAN - METROPOLITAN AREA CLINIC 92 | Facility: CLINIC | Age: 78
End: 2023-05-19

## 2023-05-19 LAB
A/G RATIO: 1.3
ALBUMIN: 4.1
ALKALINE PHOSPHATASE: 50
ALT (SGPT): 20
AST (SGOT): 26
BILIRUBIN, TOTAL: 1
BUN/CREATININE RATIO: 6
BUN: 5
CALCIUM: 9.5
CARBON DIOXIDE, TOTAL: 22
CHLORIDE: 102
CREATININE: 0.8
EGFR: 76
GLOBULIN, TOTAL: 3.1
GLUCOSE: 82
POTASSIUM: 4
PROTEIN, TOTAL: 7.2
SODIUM: 137

## 2023-06-21 ENCOUNTER — TELEPHONE ENCOUNTER (OUTPATIENT)
Dept: URBAN - METROPOLITAN AREA CLINIC 92 | Facility: CLINIC | Age: 78
End: 2023-06-21

## 2023-06-26 ENCOUNTER — TELEPHONE ENCOUNTER (OUTPATIENT)
Dept: URBAN - METROPOLITAN AREA CLINIC 92 | Facility: CLINIC | Age: 78
End: 2023-06-26

## 2023-06-26 ENCOUNTER — OFFICE VISIT (OUTPATIENT)
Dept: URBAN - METROPOLITAN AREA CLINIC 92 | Facility: CLINIC | Age: 78
End: 2023-06-26
Payer: MEDICARE

## 2023-06-26 VITALS
TEMPERATURE: 97.7 F | HEIGHT: 61 IN | DIASTOLIC BLOOD PRESSURE: 77 MMHG | HEART RATE: 67 BPM | WEIGHT: 158 LBS | BODY MASS INDEX: 29.83 KG/M2 | SYSTOLIC BLOOD PRESSURE: 154 MMHG

## 2023-06-26 DIAGNOSIS — K52.89 OTHER SPECIFIED NONINFECTIVE GASTROENTERITIS AND COLITIS: ICD-10-CM

## 2023-06-26 PROBLEM — 307496006: Status: ACTIVE | Noted: 2023-06-26

## 2023-06-26 PROCEDURE — 99213 OFFICE O/P EST LOW 20 MIN: CPT

## 2023-06-26 RX ORDER — METRONIDAZOLE 500 MG/1
1 TABLET TABLET ORAL THREE TIMES A DAY
Qty: 30 TABLET | Refills: 0 | OUTPATIENT
Start: 2023-06-26 | End: 2023-07-06

## 2023-06-26 RX ORDER — CELECOXIB 200 MG/1
CAPSULE ORAL
Qty: 0 | Refills: 0 | Status: ACTIVE | COMMUNITY
Start: 1900-01-01

## 2023-06-26 RX ORDER — OMEPRAZOLE 40 MG/1
1 CAPSULE 30 MINUTES BEFORE MORNING MEAL CAPSULE, DELAYED RELEASE ORAL ONCE A DAY
Qty: 30 | Refills: 0 | Status: ON HOLD | COMMUNITY
Start: 2021-08-05

## 2023-06-26 RX ORDER — CIPROFLOXACIN 500 MG/1
1 TABLET TABLET, FILM COATED ORAL
Qty: 20 TABLET | Refills: 0 | OUTPATIENT
Start: 2023-06-26 | End: 2023-07-06

## 2023-06-26 RX ORDER — FENTANYL 75 UG/H
APPLY 1 PATCH (75 MCG/HOUR) BY TRANSDERMAL ROUTE EVERY 72 HOURS PATCH, EXTENDED RELEASE TRANSDERMAL
Qty: 0 | Refills: 0 | Status: ACTIVE | COMMUNITY
Start: 1900-01-01

## 2023-06-26 RX ORDER — GABAPENTIN 600 MG/1
TAKE 1 TABLET (600 MG) BY ORAL ROUTE 3 TIMES PER DAY TABLET, FILM COATED ORAL
Qty: 0 | Refills: 0 | Status: ACTIVE | COMMUNITY
Start: 1900-01-01

## 2023-06-26 RX ORDER — ONDANSETRON HYDROCHLORIDE 4 MG/1
1 TABLET AS NEEDED TABLET, FILM COATED ORAL ONCE A DAY
Qty: 10 TABLET | Refills: 0 | Status: ACTIVE | COMMUNITY
Start: 2021-08-05

## 2023-06-26 RX ORDER — LETROZOLE TABLETS 2.5 MG/1
TAKE 1 TABLET (2.5 MG) BY ORAL ROUTE ONCE DAILY TABLET, FILM COATED ORAL 1
Qty: 0 | Refills: 0 | Status: ACTIVE | COMMUNITY
Start: 1900-01-01

## 2023-06-26 RX ORDER — LEVOTHYROXINE SODIUM 0.09 MG/1
TAKE 1 TABLET (88 MCG) BY ORAL ROUTE ONCE DAILY TABLET ORAL 1
Qty: 0 | Refills: 0 | Status: ACTIVE | COMMUNITY
Start: 1900-01-01

## 2023-06-26 RX ORDER — CARISOPRODOL 350 MG/1
TAKE 1 TABLET (350 MG) BY ORAL ROUTE 3 TIMES PER DAY AND AT BEDTIME TABLET ORAL
Qty: 0 | Refills: 0 | Status: ACTIVE | COMMUNITY
Start: 1900-01-01

## 2023-06-26 RX ORDER — ERGOCALCIFEROL 1.25 MG/1
CAPSULE ORAL
Qty: 0 | Refills: 0 | Status: ON HOLD | COMMUNITY
Start: 1900-01-01

## 2023-06-26 RX ORDER — NAPROXEN 500 MG/1
TAKE 1 TABLET (500 MG) BY ORAL ROUTE 2 TIMES PER DAY WITH FOOD TABLET ORAL 2
Qty: 0 | Refills: 0 | Status: ACTIVE | COMMUNITY
Start: 1900-01-01

## 2023-06-26 RX ORDER — IBUPROFEN 800 MG/1
TAKE 1 TABLET (800 MG) BY ORAL ROUTE 3 TIMES PER DAY WITH FOOD TABLET ORAL
Qty: 0 | Refills: 0 | Status: ACTIVE | COMMUNITY
Start: 1900-01-01

## 2023-06-26 RX ORDER — OXYCODONE AND ACETAMINOPHEN 7.5; 325 MG/1; MG/1
TABLET ORAL
Qty: 0 | Refills: 0 | Status: ACTIVE | COMMUNITY
Start: 1900-01-01

## 2023-06-26 RX ORDER — AMLODIPINE BESYLATE 5 MG/1
TAKE 1 TABLET (5 MG) BY ORAL ROUTE ONCE DAILY TABLET ORAL 1
Qty: 0 | Refills: 0 | Status: ACTIVE | COMMUNITY
Start: 1900-01-01

## 2023-06-26 RX ORDER — LEVOTHYROXINE SODIUM 75 UG/1
TABLET ORAL
Qty: 0 | Refills: 0 | Status: ON HOLD | COMMUNITY
Start: 1900-01-01

## 2023-06-26 RX ORDER — CYCLOSPORINE 0.5 MG/ML
INSTILL 1 DROP INTO AFFECTED EYE(S) BY OPHTHALMIC ROUTE 2 TIMES PER DAY EMULSION OPHTHALMIC 2
Qty: 1 | Refills: 0 | Status: ACTIVE | COMMUNITY
Start: 1900-01-01

## 2023-06-26 NOTE — HPI-TODAY'S VISIT:
5/18/23 78-year-old female presents today due to abdominal pain.  She was seen in March 2022 due to diarrhea and abdominal pain.  Her last colonoscopy was 10- and this demonstrated internal hemorrhoids, diverticula in the entire colon no specimens collected.  She had an upper endoscopy in July 2020 this demonstrated chronic gastritis otherwise no abnormalities She did have a stool study in August 2021 and this detected E. coli Currently states she started to have abd pain and diarrhea that started a few weeks ago. She has generalized abd pain after eating. Abd pain is better after having a BM. She has no melena, hematochezia. Has lost some weight due to not eating. Denies recent travel, sick contacts, No new meds or abx, no recent hospitalizations. Denies fevers, chills. Has nausea and one episodes of vomiting last week no hematemesis.  6/26/23 After last visit patient's stool sample was negative.  She was not able to get CT scan first time since they were not able to find a vein. she had another CT scan last friday. Results are pending. Patient is having the same complaints today.

## 2023-07-12 LAB — PANCREATIC ELASTASE, FECAL: 460

## 2023-07-19 ENCOUNTER — TELEPHONE ENCOUNTER (OUTPATIENT)
Dept: URBAN - METROPOLITAN AREA CLINIC 92 | Facility: CLINIC | Age: 78
End: 2023-07-19

## 2023-11-06 ENCOUNTER — OFFICE VISIT (OUTPATIENT)
Dept: URBAN - METROPOLITAN AREA SURGERY CENTER 16 | Facility: SURGERY CENTER | Age: 78
End: 2023-11-06
Payer: MEDICARE

## 2023-11-06 ENCOUNTER — CLAIMS CREATED FROM THE CLAIM WINDOW (OUTPATIENT)
Dept: URBAN - METROPOLITAN AREA CLINIC 4 | Facility: CLINIC | Age: 78
End: 2023-11-06
Payer: MEDICARE

## 2023-11-06 DIAGNOSIS — K63.89 APPENDICITIS EPIPLOICA: ICD-10-CM

## 2023-11-06 DIAGNOSIS — R19.7 ACUTE DIARRHEA: ICD-10-CM

## 2023-11-06 DIAGNOSIS — K57.30 ACQUIRED DIVERTICULOSIS OF COLON: ICD-10-CM

## 2023-11-06 DIAGNOSIS — K63.89 OTHER SPECIFIED DISEASES OF INTESTINE: ICD-10-CM

## 2023-11-06 DIAGNOSIS — E03.8 ADULT ONSET HYPOTHYROIDISM: ICD-10-CM

## 2023-11-06 DIAGNOSIS — K63.5 BENIGN COLON POLYP: ICD-10-CM

## 2023-11-06 PROCEDURE — 45380 COLONOSCOPY AND BIOPSY: CPT | Performed by: INTERNAL MEDICINE

## 2023-11-06 PROCEDURE — 00811 ANES LWR INTST NDSC NOS: CPT | Performed by: ANESTHESIOLOGY

## 2023-11-06 PROCEDURE — 45385 COLONOSCOPY W/LESION REMOVAL: CPT | Performed by: INTERNAL MEDICINE

## 2023-11-06 PROCEDURE — G8907 PT DOC NO EVENTS ON DISCHARG: HCPCS | Performed by: INTERNAL MEDICINE

## 2023-11-06 PROCEDURE — 88342 IMHCHEM/IMCYTCHM 1ST ANTB: CPT | Performed by: PATHOLOGY

## 2023-11-06 PROCEDURE — 88305 TISSUE EXAM BY PATHOLOGIST: CPT | Performed by: PATHOLOGY

## 2023-11-06 PROCEDURE — 00811 ANES LWR INTST NDSC NOS: CPT | Performed by: ANESTHESIOLOGIST ASSISTANT

## 2023-11-06 PROCEDURE — 88313 SPECIAL STAINS GROUP 2: CPT | Performed by: PATHOLOGY

## 2023-11-29 ENCOUNTER — OFFICE VISIT (OUTPATIENT)
Dept: URBAN - METROPOLITAN AREA CLINIC 92 | Facility: CLINIC | Age: 78
End: 2023-11-29
Payer: MEDICARE

## 2023-11-29 VITALS
WEIGHT: 157.6 LBS | HEART RATE: 62 BPM | DIASTOLIC BLOOD PRESSURE: 80 MMHG | TEMPERATURE: 96.1 F | SYSTOLIC BLOOD PRESSURE: 174 MMHG | BODY MASS INDEX: 29.76 KG/M2 | HEIGHT: 61 IN

## 2023-11-29 DIAGNOSIS — R10.84 GENERALIZED ABDOMINAL PAIN: ICD-10-CM

## 2023-11-29 DIAGNOSIS — K57.92 DIVERTICULITIS: ICD-10-CM

## 2023-11-29 PROCEDURE — 99214 OFFICE O/P EST MOD 30 MIN: CPT

## 2023-11-29 RX ORDER — GABAPENTIN 600 MG/1
TAKE 1 TABLET (600 MG) BY ORAL ROUTE 3 TIMES PER DAY TABLET, FILM COATED ORAL
Qty: 0 | Refills: 0 | Status: ACTIVE | COMMUNITY
Start: 1900-01-01

## 2023-11-29 RX ORDER — METRONIDAZOLE 500 MG/1
1 TABLET TABLET ORAL THREE TIMES A DAY
Qty: 30 TABLET | Refills: 0
Start: 2023-06-26 | End: 2023-12-09

## 2023-11-29 RX ORDER — CIPROFLOXACIN 500 MG/1
1 TABLET TABLET, FILM COATED ORAL
Qty: 20 TABLET | Refills: 0
Start: 2023-06-26 | End: 2023-12-09

## 2023-11-29 RX ORDER — CYCLOSPORINE 0.5 MG/ML
INSTILL 1 DROP INTO AFFECTED EYE(S) BY OPHTHALMIC ROUTE 2 TIMES PER DAY EMULSION OPHTHALMIC 2
Qty: 1 | Refills: 0 | Status: ACTIVE | COMMUNITY
Start: 1900-01-01

## 2023-11-29 RX ORDER — OXYCODONE AND ACETAMINOPHEN 7.5; 325 MG/1; MG/1
TABLET ORAL
Qty: 0 | Refills: 0 | Status: ACTIVE | COMMUNITY
Start: 1900-01-01

## 2023-11-29 RX ORDER — LEVOTHYROXINE SODIUM 0.09 MG/1
TAKE 1 TABLET (88 MCG) BY ORAL ROUTE ONCE DAILY TABLET ORAL 1
Qty: 0 | Refills: 0 | Status: ACTIVE | COMMUNITY
Start: 1900-01-01

## 2023-11-29 NOTE — PHYSICAL EXAM GASTROINTESTINAL
Abdomen,  soft, Diffuse TTP t/o, nondistended,  no guarding or rigidity,  no masses palpable,  normal bowel sounds Liver and Spleen,no hepatosplenomegaly

## 2023-11-29 NOTE — HPI-TODAY'S VISIT:
5/18/23 78-year-old female presents today due to abdominal pain.  She was seen in March 2022 due to diarrhea and abdominal pain.  Her last colonoscopy was 10- and this demonstrated internal hemorrhoids, diverticula in the entire colon no specimens collected.  She had an upper endoscopy in July 2020 this demonstrated chronic gastritis otherwise no abnormalities She did have a stool study in August 2021 and this detected E. coli Currently states she started to have abd pain and diarrhea that started a few weeks ago. She has generalized abd pain after eating. Abd pain is better after having a BM. She has no melena, hematochezia. Has lost some weight due to not eating. Denies recent travel, sick contacts, No new meds or abx, no recent hospitalizations. Denies fevers, chills. Has nausea and one episodes of vomiting last week no hematemesis.  6/26/23 After last visit patient's stool sample was negative.  She was not able to get CT scan first time since they were not able to find a vein. she had another CT scan last friday. Results are pending. Patient is having the same complaints today.  11/29/23 After last visit CT showed diverticulitis in distal descending colon was sent in [facility] but was not taken? she is seen today with her daughter who states she may not have taken this. Pancreatic elastase was normal  Colonoscopy 11/6/23 demonstrated diverticulosis SC, AC and Cecum, 2 benign mucosal polyps, random biopsies were negative for microscopic colitis Today notes she was feeling better but now having LLQ, LUQ and RLQ pain that is worse with food. She has no relief with BM. She has 1 Bm daily which is harder and occasionally has to strain but this is not new for her. no blood or black. No fevers or chills. Has some nausea no vomiting.

## 2023-11-30 LAB
ABSOLUTE BASOPHILS: 9
ABSOLUTE EOSINOPHILS: 31
ABSOLUTE LYMPHOCYTES: 1135
ABSOLUTE MONOCYTES: 391
ABSOLUTE NEUTROPHILS: 1535
BASOPHILS: 0.3
EOSINOPHILS: 1
HEMATOCRIT: 33.8
HEMOGLOBIN: 11.2
LYMPHOCYTES: 36.6
MCH: 32
MCHC: 33.1
MCV: 96.6
MONOCYTES: 12.6
MPV: 10
NEUTROPHILS: 49.5
PLATELET COUNT: 205
RDW: 12.6
RED BLOOD CELL COUNT: 3.5
WHITE BLOOD CELL COUNT: 3.1

## 2023-12-22 ENCOUNTER — LAB OUTSIDE AN ENCOUNTER (OUTPATIENT)
Dept: URBAN - METROPOLITAN AREA CLINIC 92 | Facility: CLINIC | Age: 78
End: 2023-12-22

## 2023-12-22 ENCOUNTER — OFFICE VISIT (OUTPATIENT)
Dept: URBAN - METROPOLITAN AREA CLINIC 92 | Facility: CLINIC | Age: 78
End: 2023-12-22
Payer: MEDICARE

## 2023-12-22 VITALS
HEART RATE: 66 BPM | SYSTOLIC BLOOD PRESSURE: 140 MMHG | BODY MASS INDEX: 28.05 KG/M2 | TEMPERATURE: 96.1 F | HEIGHT: 61 IN | WEIGHT: 148.6 LBS | DIASTOLIC BLOOD PRESSURE: 74 MMHG

## 2023-12-22 DIAGNOSIS — R63.4 WEIGHT LOSS: ICD-10-CM

## 2023-12-22 DIAGNOSIS — K57.92 DIVERTICULITIS: ICD-10-CM

## 2023-12-22 DIAGNOSIS — R10.31 RIGHT LOWER QUADRANT PAIN: ICD-10-CM

## 2023-12-22 DIAGNOSIS — R10.84 GENERALIZED ABDOMINAL PAIN: ICD-10-CM

## 2023-12-22 DIAGNOSIS — R10.32 LEFT LOWER QUADRANT PAIN: ICD-10-CM

## 2023-12-22 PROCEDURE — 99213 OFFICE O/P EST LOW 20 MIN: CPT

## 2023-12-22 RX ORDER — GABAPENTIN 600 MG/1
TAKE 1 TABLET (600 MG) BY ORAL ROUTE 3 TIMES PER DAY TABLET, FILM COATED ORAL
Qty: 0 | Refills: 0 | Status: ACTIVE | COMMUNITY
Start: 1900-01-01

## 2023-12-22 RX ORDER — OXYCODONE AND ACETAMINOPHEN 7.5; 325 MG/1; MG/1
TABLET ORAL
Qty: 0 | Refills: 0 | Status: ACTIVE | COMMUNITY
Start: 1900-01-01

## 2023-12-22 RX ORDER — CYCLOSPORINE 0.5 MG/ML
INSTILL 1 DROP INTO AFFECTED EYE(S) BY OPHTHALMIC ROUTE 2 TIMES PER DAY EMULSION OPHTHALMIC 2
Qty: 1 | Refills: 0 | Status: ACTIVE | COMMUNITY
Start: 1900-01-01

## 2023-12-22 RX ORDER — LEVOTHYROXINE SODIUM 0.09 MG/1
TAKE 1 TABLET (88 MCG) BY ORAL ROUTE ONCE DAILY TABLET ORAL 1
Qty: 0 | Refills: 0 | Status: ACTIVE | COMMUNITY
Start: 1900-01-01

## 2023-12-22 NOTE — PHYSICAL EXAM GASTROINTESTINAL
Abdomen,  soft, LLQ and RLQ ttp, nondistended,  no guarding or rigidity,  no masses palpable,  normal bowel sounds Liver and Spleen,no hepatosplenomegaly

## 2023-12-22 NOTE — HPI-TODAY'S VISIT:
5/18/23 78-year-old female presents today due to abdominal pain.  She was seen in March 2022 due to diarrhea and abdominal pain.  Her last colonoscopy was 10- and this demonstrated internal hemorrhoids, diverticula in the entire colon no specimens collected.  She had an upper endoscopy in July 2020 this demonstrated chronic gastritis otherwise no abnormalities She did have a stool study in August 2021 and this detected E. coli Currently states she started to have abd pain and diarrhea that started a few weeks ago. She has generalized abd pain after eating. Abd pain is better after having a BM. She has no melena, hematochezia. Has lost some weight due to not eating. Denies recent travel, sick contacts, No new meds or abx, no recent hospitalizations. Denies fevers, chills. Has nausea and one episodes of vomiting last week no hematemesis.  6/26/23 After last visit patient's stool sample was negative.  She was not able to get CT scan first time since they were not able to find a vein. she had another CT scan last friday. Results are pending. Patient is having the same complaints today.  11/29/23 After last visit CT showed diverticulitis in distal descending colon was sent in [facility] but was not taken? she is seen today with her daughter who states she may not have taken this. Pancreatic elastase was normal  Colonoscopy 11/6/23 demonstrated diverticulosis SC, AC and Cecum, 2 benign mucosal polyps, random biopsies were negative for microscopic colitis Today notes she was feeling better but now having LLQ, LUQ and RLQ pain that is worse with food. She has no relief with BM. She has 1 Bm daily which is harder and occasionally has to strain but this is not new for her. no blood or black. No fevers or chills. Has some nausea no vomiting.  12/22/23 Pt finished course of abx felt a little better. She is still noting abd pain mainly in the LLQ and RLQ which is still worse with food. Due to this she has not been eating and has lost 10 pounds since I last saw her. BM are still normal no hematochezia or melena. She has not fevers or chills. Still has nausea no vomiting Of note she had a fall 2 weeks ago and fell on the left side of her face and wrist. She does have extensive swelling today and subconjunctival hemorrhage. She was seen at the ED and has no fractures.  She did have slightly lower blood counts at last visit but [place] labs from last week show that hgb has normalized to 13.

## 2024-01-19 ENCOUNTER — TELEPHONE ENCOUNTER (OUTPATIENT)
Dept: URBAN - METROPOLITAN AREA CLINIC 92 | Facility: CLINIC | Age: 79
End: 2024-01-19

## 2024-01-19 PROBLEM — 735593008: Status: ACTIVE | Noted: 2024-01-19

## 2024-01-19 RX ORDER — AMOXICILLIN AND CLAVULANATE POTASSIUM 875; 125 MG/1; MG/1
1 TABLET TABLET, FILM COATED ORAL
Qty: 20 TABLET | Refills: 0 | OUTPATIENT
Start: 2024-01-19 | End: 2024-01-29

## 2024-02-09 ENCOUNTER — OV EP (OUTPATIENT)
Dept: URBAN - METROPOLITAN AREA CLINIC 92 | Facility: CLINIC | Age: 79
End: 2024-02-09

## 2024-02-19 ENCOUNTER — OV EP (OUTPATIENT)
Dept: URBAN - METROPOLITAN AREA CLINIC 92 | Facility: CLINIC | Age: 79
End: 2024-02-19
Payer: COMMERCIAL

## 2024-02-19 ENCOUNTER — LAB (OUTPATIENT)
Dept: URBAN - METROPOLITAN AREA CLINIC 92 | Facility: CLINIC | Age: 79
End: 2024-02-19

## 2024-02-19 VITALS
TEMPERATURE: 96.6 F | WEIGHT: 144 LBS | DIASTOLIC BLOOD PRESSURE: 74 MMHG | BODY MASS INDEX: 27.19 KG/M2 | SYSTOLIC BLOOD PRESSURE: 154 MMHG | HEIGHT: 61 IN | HEART RATE: 65 BPM

## 2024-02-19 DIAGNOSIS — K57.92 DIVERTICULITIS: ICD-10-CM

## 2024-02-19 DIAGNOSIS — D50.9 IRON DEFICIENCY ANEMIA, UNSPECIFIED IRON DEFICIENCY ANEMIA TYPE: ICD-10-CM

## 2024-02-19 DIAGNOSIS — R10.31 RIGHT LOWER QUADRANT PAIN: ICD-10-CM

## 2024-02-19 DIAGNOSIS — R63.4 WEIGHT LOSS: ICD-10-CM

## 2024-02-19 DIAGNOSIS — K62.5 RECTAL BLEEDING: ICD-10-CM

## 2024-02-19 DIAGNOSIS — R19.7 DIARRHEA, UNSPECIFIED TYPE: ICD-10-CM

## 2024-02-19 PROBLEM — 87522002: Status: ACTIVE | Noted: 2024-02-19

## 2024-02-19 PROCEDURE — 99214 OFFICE O/P EST MOD 30 MIN: CPT

## 2024-02-19 RX ORDER — LEVOTHYROXINE SODIUM 0.09 MG/1
TAKE 1 TABLET (88 MCG) BY ORAL ROUTE ONCE DAILY TABLET ORAL 1
Qty: 0 | Refills: 0 | Status: ACTIVE | COMMUNITY
Start: 1900-01-01

## 2024-02-19 RX ORDER — GABAPENTIN 600 MG/1
TAKE 1 TABLET (600 MG) BY ORAL ROUTE 3 TIMES PER DAY TABLET, FILM COATED ORAL
Qty: 0 | Refills: 0 | Status: ACTIVE | COMMUNITY
Start: 1900-01-01

## 2024-02-19 RX ORDER — OXYCODONE AND ACETAMINOPHEN 7.5; 325 MG/1; MG/1
TABLET ORAL
Qty: 0 | Refills: 0 | Status: ACTIVE | COMMUNITY
Start: 1900-01-01

## 2024-02-19 RX ORDER — CYCLOSPORINE 0.5 MG/ML
INSTILL 1 DROP INTO AFFECTED EYE(S) BY OPHTHALMIC ROUTE 2 TIMES PER DAY EMULSION OPHTHALMIC 2
Qty: 1 | Refills: 0 | Status: ACTIVE | COMMUNITY
Start: 1900-01-01

## 2024-02-19 NOTE — HPI-TODAY'S VISIT:
79-year-old female presents today for diverticulitis.  She is seen today with her daughter.  She was originally seen 5- complaining of generalized abdominal pain and diarrhea.  After this we obtained a stool study that was negative.  She had CT scan 6/2023 that showed diverticulitis in the descending colon and was sent to [facility] Flagyl.  We were not sure if patient took a course of medication so this was resent in November. I saw her in December she was still noting left lower quadrant right lower quadrant pain that was worse after eating.  She also lost around 10 pounds since the last time I saw her.  Due to this we obtained a CT scan in January that demonstrated cecal diverticulitis.  After this she was sent Augmentin.  She notes that she still has the same level pain that is rated 8 out of 10. Mainly in the RLQ.  She was at some point complaining of dark bowel movements and did report to [place] ED.  There she had labs 1/19/24 that demonstrated hemoglobin 11, hematocrit 33.2, MCV 96.  BON was negative so she was told to follow-up with GI.  She has been having fluctuating hemoglobin levels in the past year between 11 and 13.  She does continue to note diarrhea about 3 times a day. She also had one episode of BRBPR this Saturday no sx since. Has had 1 episode of vomiting and also has nausea.  Denies any fevers or chills After last diverticulitis flare I did recommend patient see colorectal surgery however they did not make this appointment. Her last colonoscopy was 11/6/23 that demonstrate diverticulosis in sigmoid, ascending and cecum, 2 benign polyps random biopsy was negative for MC. Her last upper endoscopy was July 2020 that demonstrated chronic gastritis otherwise no abnormalities.

## 2024-02-19 NOTE — PHYSICAL EXAM HENT:
Head,  normocephalic,  atraumatic,  Face,  Face within normal limits Alert-The patient is alert, awake and responds to voice. The patient is oriented to time, place, and person. The triage nurse is able to obtain subjective information.

## 2024-02-19 NOTE — PHYSICAL EXAM GASTROINTESTINAL
Abdomen,  soft, TTP over RLQ area, nondistended,  no guarding or rigidity,  no masses palpable,  normal bowel sounds Liver and Spleen,no hepatosplenomegaly

## 2024-02-20 LAB
FERRITIN, SERUM: 74
IRON BIND.CAP.(TIBC): 290
IRON SATURATION: 25
IRON: 73

## 2024-03-07 ENCOUNTER — EGD (OUTPATIENT)
Dept: URBAN - METROPOLITAN AREA SURGERY CENTER 16 | Facility: SURGERY CENTER | Age: 79
End: 2024-03-07
Payer: COMMERCIAL

## 2024-03-07 ENCOUNTER — LAB (OUTPATIENT)
Dept: URBAN - METROPOLITAN AREA CLINIC 4 | Facility: CLINIC | Age: 79
End: 2024-03-07
Payer: COMMERCIAL

## 2024-03-07 DIAGNOSIS — K31.89 OTHER DISEASES OF STOMACH AND DUODENUM: ICD-10-CM

## 2024-03-07 DIAGNOSIS — D50.9 ANEMIA: ICD-10-CM

## 2024-03-07 PROCEDURE — 88312 SPECIAL STAINS GROUP 1: CPT | Performed by: PATHOLOGY

## 2024-03-07 PROCEDURE — 43239 EGD BIOPSY SINGLE/MULTIPLE: CPT | Performed by: INTERNAL MEDICINE

## 2024-03-07 PROCEDURE — 88305 TISSUE EXAM BY PATHOLOGIST: CPT | Performed by: PATHOLOGY

## 2024-03-07 RX ORDER — CYCLOSPORINE 0.5 MG/ML
INSTILL 1 DROP INTO AFFECTED EYE(S) BY OPHTHALMIC ROUTE 2 TIMES PER DAY EMULSION OPHTHALMIC 2
Qty: 1 | Refills: 0 | Status: ACTIVE | COMMUNITY
Start: 1900-01-01

## 2024-03-07 RX ORDER — LEVOTHYROXINE SODIUM 0.09 MG/1
TAKE 1 TABLET (88 MCG) BY ORAL ROUTE ONCE DAILY TABLET ORAL 1
Qty: 0 | Refills: 0 | Status: ACTIVE | COMMUNITY
Start: 1900-01-01

## 2024-03-07 RX ORDER — GABAPENTIN 600 MG/1
TAKE 1 TABLET (600 MG) BY ORAL ROUTE 3 TIMES PER DAY TABLET, FILM COATED ORAL
Qty: 0 | Refills: 0 | Status: ACTIVE | COMMUNITY
Start: 1900-01-01

## 2024-03-07 RX ORDER — OXYCODONE AND ACETAMINOPHEN 7.5; 325 MG/1; MG/1
TABLET ORAL
Qty: 0 | Refills: 0 | Status: ACTIVE | COMMUNITY
Start: 1900-01-01

## 2024-04-11 ENCOUNTER — LAB (OUTPATIENT)
Dept: URBAN - METROPOLITAN AREA CLINIC 92 | Facility: CLINIC | Age: 79
End: 2024-04-11

## 2024-04-11 ENCOUNTER — OV EP (OUTPATIENT)
Dept: URBAN - METROPOLITAN AREA CLINIC 92 | Facility: CLINIC | Age: 79
End: 2024-04-11
Payer: COMMERCIAL

## 2024-04-11 VITALS
BODY MASS INDEX: 24.51 KG/M2 | HEIGHT: 61 IN | TEMPERATURE: 96.8 F | SYSTOLIC BLOOD PRESSURE: 114 MMHG | DIASTOLIC BLOOD PRESSURE: 70 MMHG | WEIGHT: 129.8 LBS | HEART RATE: 73 BPM

## 2024-04-11 DIAGNOSIS — F43.9 STRESS AT HOME: ICD-10-CM

## 2024-04-11 DIAGNOSIS — R10.84 GENERALIZED ABDOMINAL PAIN: ICD-10-CM

## 2024-04-11 DIAGNOSIS — R10.31 RIGHT LOWER QUADRANT PAIN: ICD-10-CM

## 2024-04-11 DIAGNOSIS — I70.90 ATHEROSCLEROSIS: ICD-10-CM

## 2024-04-11 DIAGNOSIS — K59.04 CHRONIC IDIOPATHIC CONSTIPATION: ICD-10-CM

## 2024-04-11 DIAGNOSIS — K57.92 DIVERTICULITIS: ICD-10-CM

## 2024-04-11 DIAGNOSIS — D50.9 IRON DEFICIENCY ANEMIA, UNSPECIFIED IRON DEFICIENCY ANEMIA TYPE: ICD-10-CM

## 2024-04-11 DIAGNOSIS — R63.4 WEIGHT LOSS: ICD-10-CM

## 2024-04-11 PROBLEM — 72092001: Status: ACTIVE | Noted: 2024-04-11

## 2024-04-11 PROBLEM — 105485001: Status: ACTIVE | Noted: 2024-04-11

## 2024-04-11 PROBLEM — 82934008: Status: ACTIVE | Noted: 2024-04-11

## 2024-04-11 PROCEDURE — 99214 OFFICE O/P EST MOD 30 MIN: CPT

## 2024-04-11 RX ORDER — LOSARTAN POTASSIUM 50 MG/1
TABLET, FILM COATED ORAL
Qty: 90 TABLET | Status: ACTIVE | COMMUNITY

## 2024-04-11 RX ORDER — OXYCODONE HYDROCHLORIDE AND ACETAMINOPHEN 7.5; 325 MG/1; MG/1
TABLET ORAL
Qty: 90 TABLET | Status: ACTIVE | COMMUNITY

## 2024-04-11 RX ORDER — CARVEDILOL 3.12 MG/1
TABLET, FILM COATED ORAL
Qty: 180 TABLET | Status: ACTIVE | COMMUNITY

## 2024-04-11 RX ORDER — LEVOTHYROXINE SODIUM 0.07 MG/1
TABLET ORAL
Qty: 90 TABLET | Status: ACTIVE | COMMUNITY

## 2024-04-11 RX ORDER — CYCLOSPORINE 0.5 MG/ML
INSTILL 1 DROP INTO AFFECTED EYE(S) BY OPHTHALMIC ROUTE 2 TIMES PER DAY EMULSION OPHTHALMIC 2
Qty: 1 | Refills: 0 | Status: ACTIVE | COMMUNITY

## 2024-04-11 RX ORDER — DICLOFENAC SODIUM 10 MG/G
GEL TOPICAL
Qty: 100 GRAM | Status: ACTIVE | COMMUNITY

## 2024-04-11 RX ORDER — MULTIVIT-MIN/IRON/FOLIC ACID/K 18-600-40
AS DIRECTED CAPSULE ORAL
Status: ACTIVE | COMMUNITY

## 2024-04-11 RX ORDER — GABAPENTIN 100 MG/1
CAPSULE ORAL
Qty: 90 CAPSULE | Status: ACTIVE | COMMUNITY

## 2024-04-11 RX ORDER — CHOLECALCIFEROL (VITAMIN D3) 25 MCG
1 CAPSULE CAPSULE ORAL ONCE A DAY
Status: ACTIVE | COMMUNITY

## 2024-04-11 NOTE — HPI-TODAY'S VISIT:
79-year-old female presents today for abdominal pain.  She is seen today with her daughter.  She was originally seen 5- complaining of generalized abdominal pain and diarrhea.  After this we obtained a stool study that was negative.  She had CT scan 6/2023 that showed diverticulitis in the descending colon and was sent to Cipro and Flagyl.  We were not sure if patient took a course of medication so this was resent in November. She had a colonoscopy 11/6/23 that demonstrate diverticulosis in sigmoid, ascending and cecum, 2 benign polyps random biopsy was negative for MC I saw her in December 2023 she was still noting left lower quadrant right lower quadrant pain that was worse after eating.  She also lost around 10 pounds since the last time I saw her.  Due to this we obtained a CT scan in January 2024 that demonstrated cecal diverticulitis.  After this she was sent Augmentin.  She notes that she still has the same level pain that is rated 8 out of 10. Mainly in the RLQ.  She was at some point complaining of dark bowel movements and did report to [place] ED.  There she had labs 1/19/24 that demonstrated hemoglobin 11, hematocrit 33.2, MCV 96.  BON was negative so she was told to follow-up with GI.  She has been having fluctuating hemoglobin levels in the past year between 11 and 13.   At last visit due to continued abd pain we obtain another CT to r/o diverticulitis since last 2 CT did demonstrate this. CT 2/26/24 demonstrated no recurrent diverticulitis, significant atherosclerosis  was also noted. she did see colorectal who indicated no issues.   Again today she c/o of significant abd pain and has been losing weight since she has not been eating. She states that she has no appetite and eating makes her stomach hurt. She is not more constipated having BM 2-3x a week since she is not eating. Her pcp gave her dicyclominebut daughter has not given this to her. She was also given zoloft due to significant stress with ehr Grandson per daughter. She has also not given this to her. She has no hematochezia or melena. No upper gi issues.  After last visit we did obtain iron studies which were stable. Ferritin 74, iron 73.  Also got a EGD 3-7-2024 demonstrated no significant abnormalities in the stomach or duodenum

## 2024-05-20 ENCOUNTER — OFFICE VISIT (OUTPATIENT)
Dept: URBAN - METROPOLITAN AREA CLINIC 92 | Facility: CLINIC | Age: 79
End: 2024-05-20
Payer: COMMERCIAL

## 2024-05-20 ENCOUNTER — DASHBOARD ENCOUNTERS (OUTPATIENT)
Age: 79
End: 2024-05-20

## 2024-05-20 VITALS
BODY MASS INDEX: 24.35 KG/M2 | SYSTOLIC BLOOD PRESSURE: 139 MMHG | DIASTOLIC BLOOD PRESSURE: 81 MMHG | WEIGHT: 129 LBS | HEART RATE: 84 BPM | TEMPERATURE: 96.4 F | HEIGHT: 61 IN

## 2024-05-20 DIAGNOSIS — K59.04 CHRONIC IDIOPATHIC CONSTIPATION: ICD-10-CM

## 2024-05-20 DIAGNOSIS — D50.9 IRON DEFICIENCY ANEMIA, UNSPECIFIED IRON DEFICIENCY ANEMIA TYPE: ICD-10-CM

## 2024-05-20 DIAGNOSIS — F43.9 STRESS AT HOME: ICD-10-CM

## 2024-05-20 DIAGNOSIS — I70.90 ATHEROSCLEROSIS: ICD-10-CM

## 2024-05-20 DIAGNOSIS — R63.4 WEIGHT LOSS: ICD-10-CM

## 2024-05-20 DIAGNOSIS — R10.31 RIGHT LOWER QUADRANT PAIN: ICD-10-CM

## 2024-05-20 DIAGNOSIS — K57.92 DIVERTICULITIS: ICD-10-CM

## 2024-05-20 DIAGNOSIS — R10.84 GENERALIZED ABDOMINAL PAIN: ICD-10-CM

## 2024-05-20 PROCEDURE — 99214 OFFICE O/P EST MOD 30 MIN: CPT

## 2024-05-20 RX ORDER — LEVOTHYROXINE SODIUM 0.07 MG/1
TABLET ORAL
Qty: 90 TABLET | Status: ACTIVE | COMMUNITY

## 2024-05-20 RX ORDER — OXYCODONE HYDROCHLORIDE AND ACETAMINOPHEN 7.5; 325 MG/1; MG/1
TABLET ORAL
Qty: 90 TABLET | Status: ACTIVE | COMMUNITY

## 2024-05-20 RX ORDER — GABAPENTIN 100 MG/1
CAPSULE ORAL
Qty: 90 CAPSULE | Status: ACTIVE | COMMUNITY

## 2024-05-20 RX ORDER — MULTIVIT-MIN/IRON/FOLIC ACID/K 18-600-40
AS DIRECTED CAPSULE ORAL
Status: ACTIVE | COMMUNITY

## 2024-05-20 RX ORDER — DICLOFENAC SODIUM 10 MG/G
GEL TOPICAL
Qty: 100 GRAM | Status: ACTIVE | COMMUNITY

## 2024-05-20 RX ORDER — LOSARTAN POTASSIUM 50 MG/1
TABLET, FILM COATED ORAL
Qty: 90 TABLET | Status: ACTIVE | COMMUNITY

## 2024-05-20 RX ORDER — CARVEDILOL 3.12 MG/1
TABLET, FILM COATED ORAL
Qty: 180 TABLET | Status: ACTIVE | COMMUNITY

## 2024-05-20 RX ORDER — CYCLOSPORINE 0.5 MG/ML
INSTILL 1 DROP INTO AFFECTED EYE(S) BY OPHTHALMIC ROUTE 2 TIMES PER DAY EMULSION OPHTHALMIC 2
Qty: 1 | Refills: 0 | Status: ACTIVE | COMMUNITY

## 2024-05-20 RX ORDER — RANOLAZINE 1000 MG/1
TABLET, EXTENDED RELEASE ORAL
Qty: 180 TABLET | Status: ACTIVE | COMMUNITY

## 2024-05-20 RX ORDER — CHOLECALCIFEROL (VITAMIN D3) 25 MCG
1 CAPSULE CAPSULE ORAL ONCE A DAY
Status: ACTIVE | COMMUNITY

## 2024-05-24 ENCOUNTER — OFFICE VISIT (OUTPATIENT)
Dept: URBAN - METROPOLITAN AREA CLINIC 92 | Facility: CLINIC | Age: 79
End: 2024-05-24

## 2024-08-20 ENCOUNTER — OFFICE VISIT (OUTPATIENT)
Dept: URBAN - METROPOLITAN AREA CLINIC 92 | Facility: CLINIC | Age: 79
End: 2024-08-20
Payer: COMMERCIAL

## 2024-08-20 VITALS
SYSTOLIC BLOOD PRESSURE: 160 MMHG | TEMPERATURE: 96.9 F | HEIGHT: 61 IN | HEART RATE: 66 BPM | DIASTOLIC BLOOD PRESSURE: 82 MMHG | BODY MASS INDEX: 23.94 KG/M2 | WEIGHT: 126.8 LBS

## 2024-08-20 DIAGNOSIS — K57.92 DIVERTICULITIS: ICD-10-CM

## 2024-08-20 DIAGNOSIS — R10.84 GENERALIZED ABDOMINAL PAIN: ICD-10-CM

## 2024-08-20 DIAGNOSIS — D50.8 ACHLORHYDRIC ANEMIA: ICD-10-CM

## 2024-08-20 DIAGNOSIS — K59.09 CHANGE IN BOWEL MOVEMENTS INTERMITTENT CONSTIPATION. URGENCY IN THE MORNING.: ICD-10-CM

## 2024-08-20 PROCEDURE — 99214 OFFICE O/P EST MOD 30 MIN: CPT

## 2024-08-20 RX ORDER — OXYCODONE HYDROCHLORIDE AND ACETAMINOPHEN 7.5; 325 MG/1; MG/1
TABLET ORAL
Qty: 90 TABLET | Status: ACTIVE | COMMUNITY

## 2024-08-20 RX ORDER — DICLOFENAC SODIUM 10 MG/G
GEL TOPICAL
Qty: 100 GRAM | Status: ACTIVE | COMMUNITY

## 2024-08-20 RX ORDER — CARVEDILOL 3.12 MG/1
TABLET, FILM COATED ORAL
Qty: 180 TABLET | Status: ON HOLD | COMMUNITY

## 2024-08-20 RX ORDER — RANOLAZINE 1000 MG/1
TABLET, EXTENDED RELEASE ORAL
Qty: 180 TABLET | Status: ACTIVE | COMMUNITY

## 2024-08-20 RX ORDER — CYCLOSPORINE 0.5 MG/ML
INSTILL 1 DROP INTO AFFECTED EYE(S) BY OPHTHALMIC ROUTE 2 TIMES PER DAY EMULSION OPHTHALMIC 2
Qty: 1 | Refills: 0 | Status: ACTIVE | COMMUNITY

## 2024-08-20 RX ORDER — MULTIVIT-MIN/IRON/FOLIC ACID/K 18-600-40
AS DIRECTED CAPSULE ORAL
Status: ACTIVE | COMMUNITY

## 2024-08-20 RX ORDER — CHOLECALCIFEROL (VITAMIN D3) 25 MCG
1 CAPSULE CAPSULE ORAL ONCE A DAY
Status: ACTIVE | COMMUNITY

## 2024-08-20 RX ORDER — LOSARTAN POTASSIUM 50 MG/1
TABLET, FILM COATED ORAL
Qty: 90 TABLET | Status: ACTIVE | COMMUNITY

## 2024-08-20 RX ORDER — LEVOTHYROXINE SODIUM 0.07 MG/1
TABLET ORAL
Qty: 90 TABLET | Status: ACTIVE | COMMUNITY

## 2024-08-20 RX ORDER — GABAPENTIN 100 MG/1
CAPSULE ORAL
Qty: 90 CAPSULE | Status: ON HOLD | COMMUNITY

## 2024-08-20 NOTE — HPI-TODAY'S VISIT:
79-year-old female presents today for abdominal pain.  She is seen today with her daughter.  She was originally seen 5- complaining of generalized abdominal pain and diarrhea.  After this we obtained a stool study that was negative.  She had CT scan 6/2023 that showed diverticulitis in the descending colon and was sent to Cipro and Flagyl.  We were not sure if patient took a course of medicationso this was resent in November. She had a colonoscopy 11/6/23 that demonstrate diverticulosis in sigmoid, ascending and cecum, 2 benign polyps random biopsy was negative for MC I saw her in December 2023 she was still noting left lower quadrant right lower quadrant pain that was worse after eating.  She also lost around 10 pounds since the last time I saw her.  Due to this we obtained a CT scan in January 2024 that demonstrated cecal diverticulitis.  After this she was sent Augmentin. I sent her to [facility] who stated they do not believe her joellen was from diverticulitis after a neg CT scan.   Due to continued pain we obtained a CT angio.  4- demonstrated limited exam due to motion, mild to moderate atherosclerosis NO abdominal aortic aneurysm or dissection, diverticulosis coli apparent mild pericolonic stranding in the ascending colon however this may be artifactual due to motion however mild diverticulitis is not excluded, no fluid collection, distended urinary bladder consider follow-up renal bladder ultrasound no evidence of bowel obstruction or acute appendicitis. Due to abdominal pain patient was given Augmentin and she finished this course.   Her pain is better today. Weight stable. Has gained appetite back. She was started on iron few weeks ago and started to have constipation. Does still have 2-3 BM a week but not taking miralax daily. No hematochezia or melena. She has GERD sx and has rx for pantoprazole but not taking this daily. No n/v/f/c, dysphagia or odynophagia.  At last visit daughter notes pt was stressed due to her Grandson. Was given zoloft rx to help with this but daughter stopped giving this to her since her appetite and stress seems better.  Labs 2/2024 showed iron studies which were stable. Ferritin 74, iron 73.  EGD 3-7-2024 demonstrated no significant abnormalities in the stomach or duodenum Labs from pcp 7/31/24 showed hgb 11.1, hct 33.5, mcv 103.7- she was placed on iron and b12 after this. She has not had iron or ferritin labs in feb. She has appt with pcp tomorrow. She est with a new pcp Dr Ayers with [facility].

## 2024-08-21 ENCOUNTER — TELEPHONE ENCOUNTER (OUTPATIENT)
Dept: URBAN - METROPOLITAN AREA CLINIC 92 | Facility: CLINIC | Age: 79
End: 2024-08-21

## 2024-08-21 LAB
FERRITIN, SERUM: 106
IRON BIND.CAP.(TIBC): 297
IRON SATURATION: 24
IRON: 72

## 2024-10-22 ENCOUNTER — OFFICE VISIT (OUTPATIENT)
Dept: URBAN - METROPOLITAN AREA CLINIC 92 | Facility: CLINIC | Age: 79
End: 2024-10-22
Payer: COMMERCIAL

## 2024-10-22 VITALS
SYSTOLIC BLOOD PRESSURE: 189 MMHG | DIASTOLIC BLOOD PRESSURE: 87 MMHG | HEIGHT: 61 IN | TEMPERATURE: 97.1 F | WEIGHT: 130 LBS | BODY MASS INDEX: 24.55 KG/M2 | HEART RATE: 66 BPM

## 2024-10-22 DIAGNOSIS — I70.90 ATHEROSCLEROSIS: ICD-10-CM

## 2024-10-22 DIAGNOSIS — K59.04 CHRONIC IDIOPATHIC CONSTIPATION: ICD-10-CM

## 2024-10-22 DIAGNOSIS — F43.9 STRESS AT HOME: ICD-10-CM

## 2024-10-22 DIAGNOSIS — K83.8 DILATED BILE DUCT: ICD-10-CM

## 2024-10-22 DIAGNOSIS — R63.4 WEIGHT LOSS: ICD-10-CM

## 2024-10-22 DIAGNOSIS — R10.84 GENERALIZED ABDOMINAL PAIN: ICD-10-CM

## 2024-10-22 DIAGNOSIS — K57.92 DIVERTICULITIS: ICD-10-CM

## 2024-10-22 PROBLEM — 123608004: Status: ACTIVE | Noted: 2024-10-22

## 2024-10-22 PROCEDURE — 99214 OFFICE O/P EST MOD 30 MIN: CPT

## 2024-10-22 RX ORDER — OXYCODONE HYDROCHLORIDE AND ACETAMINOPHEN 7.5; 325 MG/1; MG/1
TABLET ORAL
Qty: 90 TABLET | Status: ACTIVE | COMMUNITY

## 2024-10-22 RX ORDER — BORIC ACID
AS DIRECTED POWDER (GRAM) MISCELLANEOUS
Status: ACTIVE | COMMUNITY

## 2024-10-22 RX ORDER — CHOLECALCIFEROL (VITAMIN D3) 25 MCG
1 CAPSULE CAPSULE ORAL ONCE A DAY
Status: ACTIVE | COMMUNITY

## 2024-10-22 RX ORDER — CYCLOSPORINE 0.5 MG/ML
INSTILL 1 DROP INTO AFFECTED EYE(S) BY OPHTHALMIC ROUTE 2 TIMES PER DAY EMULSION OPHTHALMIC 2
Qty: 1 | Refills: 0 | Status: ACTIVE | COMMUNITY

## 2024-10-22 RX ORDER — MULTIVIT-MIN/IRON/FOLIC ACID/K 18-600-40
AS DIRECTED CAPSULE ORAL
Status: ACTIVE | COMMUNITY

## 2024-10-22 RX ORDER — LEVOTHYROXINE SODIUM 0.07 MG/1
TABLET ORAL
Qty: 90 TABLET | Status: ACTIVE | COMMUNITY

## 2024-10-22 RX ORDER — CARVEDILOL 3.12 MG/1
TABLET, FILM COATED ORAL
Qty: 180 TABLET | Status: ON HOLD | COMMUNITY

## 2024-10-22 RX ORDER — DICLOFENAC SODIUM 10 MG/G
GEL TOPICAL
Qty: 100 GRAM | Status: ACTIVE | COMMUNITY

## 2024-10-22 RX ORDER — LOSARTAN POTASSIUM 50 MG/1
TABLET, FILM COATED ORAL
Qty: 90 TABLET | Status: ACTIVE | COMMUNITY

## 2024-10-22 RX ORDER — RANOLAZINE 1000 MG/1
TABLET, EXTENDED RELEASE ORAL
Qty: 180 TABLET | Status: ACTIVE | COMMUNITY

## 2024-10-22 RX ORDER — GABAPENTIN 100 MG/1
CAPSULE ORAL
Qty: 90 CAPSULE | Status: ON HOLD | COMMUNITY

## 2024-10-22 NOTE — HPI-TODAY'S VISIT:
8/20/24 79-year-old female presents today for abdominal pain.  She is seen today with her daughter.  She was originally seen 5- complaining of generalized abdominal pain and diarrhea.  After this we obtained a stool study that was negative.  She had CT scan 6/2023 that showed diverticulitis in the descending colon and was sent to Cipro and Flagyl.  We were not sure if patient took a course of medicationso this was resent in November. She had a colonoscopy 11/6/23 that demonstrate diverticulosis in sigmoid, ascending and cecum, 2 benign polyps random biopsy was negative for MC I saw her in December 2023 she was still noting left lower quadrant right lower quadrant pain that was worse after eating.  She also lost around 10 pounds since the last time I saw her.  Due to this we obtained a CT scan in January 2024 that demonstrated cecal diverticulitis.  After this she was sent Augmentin. I sent her to [facility] who stated they do not believe her joellen was from diverticulitis after a neg CT scan.   Due to continued pain we obtained a CT angio  4- demonstrated limited exam due to motion, mild to moderate atherosclerosis NO abdominal aortic aneurysm or dissection, diverticulosis coli apparent mild pericolonic stranding in the ascending colon however this may be artifactual due to motion however mild diverticulitis is not excluded, no fluid collection, distended urinary bladder consider follow-up renal bladder ultrasound no evidence of bowel obstruction or acute appendicitis. Due to abdominal pain patient was given Augmentin and she finished this course.   Her pain is better today. Weight stable. Has gained appetite back. She was started on iron few weeks ago and started to have constipation. Does still have 2-3 BM a week but not taking miralax daily. No hematochezia or melena. She has GERD sx and has rx for pantoprazole but not taking this daily. No n/v/f/c, dysphagia or odynophagia.  At last visit daughter notes pt was stressed due to her Grandson. Was given zoloft rx to help with this but daughter stopped giving this to her since her appetite and stress seems better.  Labs 2/2024 showed iron studies which were stable. Ferritin 74, iron 73.  EGD 3-7-2024 demonstrated no significant abnormalities in the stomach or duodenum Labs from pcp 7/31/24 showed hgb 11.1, hct 33.5, mcv 103.7- she was placed on iron and b12 after this. She has not had iron or ferritin labs in feb. She has appt with pcp tomorrow. She est with a new pcp Dr Ayers with [facility].  10/22/24 After last visit ferritin 106, iron 72. She was told to start iron but this constipated her so she d/c this. Do not have records from pcp showing HUGO. New PCP is dr. quintero  She was seen at [facility] ED 9/17/24 due to abd pain. there had a CT showing Stable minimal proximal intrahepatic biliary duct dilatation and stable mild extrahepatic common bile duct prominence tapering normally to the level the ampulla of Vater likely related to cholecystectomy status and patient's stated age. Correlate clinically and findings could be further characterized by\~MRI/MRCP as clinically warranted. No small bowel dilatation. Scattered colonic diverticulosis without diverticulitis. Minimal fecal loadin throughout the colon. Normal caliber right lower quadrant appendix. Suggested gastric wall thickening. She has MRI on monday her pcp ordered this.  She is not taking miralax so is still contipated. Her pain is RLQ and LUQ. She is on pantoprazole 40mg daily with no heartburn, regurg, n/v/f/c, dysphagia or odynophaiga. She has no BRBPR or melena. Weight is stable and gaining back.

## 2024-12-12 ENCOUNTER — OFFICE VISIT (OUTPATIENT)
Dept: URBAN - METROPOLITAN AREA CLINIC 92 | Facility: CLINIC | Age: 79
End: 2024-12-12

## 2024-12-12 RX ORDER — RANOLAZINE 1000 MG/1
TABLET, EXTENDED RELEASE ORAL
Qty: 180 TABLET | Status: ACTIVE | COMMUNITY

## 2024-12-12 RX ORDER — MULTIVIT-MIN/IRON/FOLIC ACID/K 18-600-40
AS DIRECTED CAPSULE ORAL
Status: ACTIVE | COMMUNITY

## 2024-12-12 RX ORDER — CHOLECALCIFEROL (VITAMIN D3) 25 MCG
1 CAPSULE CAPSULE ORAL ONCE A DAY
Status: ACTIVE | COMMUNITY

## 2024-12-12 RX ORDER — DICLOFENAC SODIUM 10 MG/G
GEL TOPICAL
Qty: 100 GRAM | Status: ACTIVE | COMMUNITY

## 2024-12-12 RX ORDER — LOSARTAN POTASSIUM 50 MG/1
TABLET, FILM COATED ORAL
Qty: 90 TABLET | Status: ACTIVE | COMMUNITY

## 2024-12-12 RX ORDER — CYCLOSPORINE 0.5 MG/ML
INSTILL 1 DROP INTO AFFECTED EYE(S) BY OPHTHALMIC ROUTE 2 TIMES PER DAY EMULSION OPHTHALMIC 2
Qty: 1 | Refills: 0 | Status: ACTIVE | COMMUNITY

## 2024-12-12 RX ORDER — BORIC ACID
AS DIRECTED POWDER (GRAM) MISCELLANEOUS
Status: ACTIVE | COMMUNITY

## 2024-12-12 RX ORDER — LEVOTHYROXINE SODIUM 0.07 MG/1
TABLET ORAL
Qty: 90 TABLET | Status: ACTIVE | COMMUNITY

## 2024-12-12 RX ORDER — CARVEDILOL 3.12 MG/1
TABLET, FILM COATED ORAL
Qty: 180 TABLET | Status: ON HOLD | COMMUNITY

## 2024-12-12 RX ORDER — OXYCODONE HYDROCHLORIDE AND ACETAMINOPHEN 7.5; 325 MG/1; MG/1
TABLET ORAL
Qty: 90 TABLET | Status: ACTIVE | COMMUNITY

## 2024-12-12 RX ORDER — GABAPENTIN 100 MG/1
CAPSULE ORAL
Qty: 90 CAPSULE | Status: ON HOLD | COMMUNITY

## 2024-12-12 NOTE — HPI-TODAY'S VISIT:
8/20/24 79-year-old female presents today for abdominal pain.  She is seen today with her daughter.  She was originally seen 5- complaining of generalized abdominal pain and diarrhea.  After this we obtained a stool study that was negative.  She had CT scan 6/2023 that showed diverticulitis in the descending colon and was sent to Cipro and Flagyl.  We were not sure if patient took a course of medicationso this was resent in November. She had a colonoscopy 11/6/23 that demonstrate diverticulosis in sigmoid, ascending and cecum, 2 benign polyps random biopsy was negative for MC I saw her in December 2023 she was still noting left lower quadrant right lower quadrant pain that was worse after eating.  She also lost around 10 pounds since the last time I saw her.  Due to this we obtained a CT scan in January 2024 that demonstrated cecal diverticulitis.  After this she was sent Augmentin. I sent her to [facility] who stated they do not believe her joellen was from diverticulitis after a neg CT scan.   Due to continued pain we obtained a CT angio  4- demonstrated limited exam due to motion, mild to moderate atherosclerosis NO abdominal aortic aneurysm or dissection, diverticulosis coli apparent mild pericolonic stranding in the ascending colon however this may be artifactual due to motion however mild diverticulitis is not excluded, no fluid collection, distended urinary bladder consider follow-up renal bladder ultrasound no evidence of bowel obstruction or acute appendicitis. Due to abdominal pain patient was given Augmentin and she finished this course.   Her pain is better today. Weight stable. Has gained appetite back. She was started on iron few weeks ago and started to have constipation. Does still have 2-3 BM a week but not taking miralax daily. No hematochezia or melena. She has GERD sx and has rx for pantoprazole but not taking this daily. No n/v/f/c, dysphagia or odynophagia.  At last visit daughter notes pt was stressed due to her Grandson. Was given zoloft rx to help with this but daughter stopped giving this to her since her appetite and stress seems better.  Labs 2/2024 showed iron studies which were stable. Ferritin 74, iron 73.  EGD 3-7-2024 demonstrated no significant abnormalities in the stomach or duodenum Labs from pcp 7/31/24 showed hgb 11.1, hct 33.5, mcv 103.7- she was placed on iron and b12 after this. She has not had iron or ferritin labs in feb. She has appt with pcp tomorrow. She est with a new pcp Dr Ayers with [facility].  10/22/24 After last visit ferritin 106, iron 72. She was told to start iron but this constipated her so she d/c this. Do not have records from pcp showing HUGO. New PCP is dr. quintero  She was seen at [facility] ED 9/17/24 due to abd pain. there had a CT showing Stable minimal proximal intrahepatic biliary duct dilatation and stable mild extrahepatic common bile duct prominence tapering normally to the level the ampulla of Vater likely related to cholecystectomy status and patient's stated age. Correlate clinically and findings could be further characterized by\~MRI/MRCP as clinically warranted. No small bowel dilatation. Scattered colonic diverticulosis without diverticulitis. Minimal fecal loadin throughout the colon. Normal caliber right lower quadrant appendix. Suggested gastric wall thickening. She has MRI on monday her pcp ordered this.  She is not taking miralax so is still contipated. Her pain is RLQ and LUQ. She is on pantoprazole 40mg daily with no heartburn, regurg, n/v/f/c, dysphagia or odynophaiga. She has no BRBPR or melena. Weight is stable and gaining back. After last visit patient was given [facility] 72 MRI 10- demonstrated extrahepatic biliary distention without choledocholithiasis no pancreatic mass, scattered liver cysts, stool retention in the colon, moderate gastric distention no definitive masses, bile duct 12 mm no choledocholithiasis

## 2024-12-18 ENCOUNTER — OFFICE VISIT (OUTPATIENT)
Dept: URBAN - METROPOLITAN AREA CLINIC 92 | Facility: CLINIC | Age: 79
End: 2024-12-18
Payer: COMMERCIAL

## 2024-12-18 VITALS
HEIGHT: 61 IN | WEIGHT: 125 LBS | TEMPERATURE: 97 F | BODY MASS INDEX: 23.6 KG/M2 | HEART RATE: 73 BPM | DIASTOLIC BLOOD PRESSURE: 82 MMHG | SYSTOLIC BLOOD PRESSURE: 169 MMHG

## 2024-12-18 DIAGNOSIS — F43.9 STRESS AT HOME: ICD-10-CM

## 2024-12-18 DIAGNOSIS — K83.8 DILATED BILE DUCT: ICD-10-CM

## 2024-12-18 DIAGNOSIS — I70.90 ATHEROSCLEROSIS: ICD-10-CM

## 2024-12-18 DIAGNOSIS — R10.84 GENERALIZED ABDOMINAL PAIN: ICD-10-CM

## 2024-12-18 DIAGNOSIS — K57.92 DIVERTICULITIS: ICD-10-CM

## 2024-12-18 DIAGNOSIS — R10.31 RIGHT LOWER QUADRANT PAIN: ICD-10-CM

## 2024-12-18 DIAGNOSIS — R63.4 WEIGHT LOSS: ICD-10-CM

## 2024-12-18 DIAGNOSIS — K59.04 CHRONIC IDIOPATHIC CONSTIPATION: ICD-10-CM

## 2024-12-18 PROCEDURE — 99214 OFFICE O/P EST MOD 30 MIN: CPT

## 2024-12-18 RX ORDER — CARVEDILOL 3.12 MG/1
TABLET, FILM COATED ORAL
Qty: 180 TABLET | Status: ON HOLD | COMMUNITY

## 2024-12-18 RX ORDER — LOSARTAN POTASSIUM 50 MG/1
TABLET, FILM COATED ORAL
Qty: 90 TABLET | Status: ACTIVE | COMMUNITY

## 2024-12-18 RX ORDER — MULTIVIT-MIN/IRON/FOLIC ACID/K 18-600-40
AS DIRECTED CAPSULE ORAL
Status: ACTIVE | COMMUNITY

## 2024-12-18 RX ORDER — BORIC ACID
AS DIRECTED POWDER (GRAM) MISCELLANEOUS
Status: ACTIVE | COMMUNITY

## 2024-12-18 RX ORDER — GABAPENTIN 100 MG/1
CAPSULE ORAL
Qty: 90 CAPSULE | Status: ON HOLD | COMMUNITY

## 2024-12-18 RX ORDER — CYCLOSPORINE 0.5 MG/ML
INSTILL 1 DROP INTO AFFECTED EYE(S) BY OPHTHALMIC ROUTE 2 TIMES PER DAY EMULSION OPHTHALMIC 2
Qty: 1 | Refills: 0 | Status: ACTIVE | COMMUNITY

## 2024-12-18 RX ORDER — RANOLAZINE 1000 MG/1
TABLET, EXTENDED RELEASE ORAL
Qty: 180 TABLET | Status: ACTIVE | COMMUNITY

## 2024-12-18 RX ORDER — LEVOTHYROXINE SODIUM 0.07 MG/1
TABLET ORAL
Qty: 90 TABLET | Status: ACTIVE | COMMUNITY

## 2024-12-18 RX ORDER — CHOLECALCIFEROL (VITAMIN D3) 25 MCG
1 CAPSULE CAPSULE ORAL ONCE A DAY
Status: ACTIVE | COMMUNITY

## 2024-12-18 RX ORDER — OXYCODONE HYDROCHLORIDE AND ACETAMINOPHEN 7.5; 325 MG/1; MG/1
TABLET ORAL
Qty: 90 TABLET | Status: ACTIVE | COMMUNITY

## 2024-12-18 RX ORDER — DICLOFENAC SODIUM 10 MG/G
GEL TOPICAL
Qty: 100 GRAM | Status: ACTIVE | COMMUNITY

## 2024-12-18 NOTE — HPI-TODAY'S VISIT:
8/20/24 79-year-old female presents today for abdominal pain.  She is seen today with her daughter.  She was originally seen 5- complaining of generalized abdominal pain and diarrhea.  After this we obtained a stool study that was negative.  She had CT scan 6/2023 that showed diverticulitis in the descending colon and was sent to Cipro and Flagyl.  We were not sure if patient took a course of medicationso this was resent in November. She had a colonoscopy 11/6/23 that demonstrate diverticulosis in sigmoid, ascending and cecum, 2 benign polyps random biopsy was negative for MC I saw her in December 2023 she was still noting left lower quadrant right lower quadrant pain that was worse after eating.  She also lost around 10 pounds since the last time I saw her.  Due to this we obtained a CT scan in January 2024 that demonstrated cecal diverticulitis.  After this she was sent Augmentin. I sent her to [facility] who stated they do not believe her joellen was from diverticulitis after a neg CT scan.   Due to continued pain we obtained a CT angio  4- demonstrated limited exam due to motion, mild to moderate atherosclerosis NO abdominal aortic aneurysm or dissection, diverticulosis coli apparent mild pericolonic stranding in the ascending colon however this may be artifactual due to motion however mild diverticulitis is not excluded, no fluid collection, distended urinary bladder consider follow-up renal bladder ultrasound no evidence of bowel obstruction or acute appendicitis. Due to abdominal pain patient was given Augmentin and she finished this course.   Her pain is better today. Weight stable. Has gained appetite back. She was started on iron few weeks ago and started to have constipation. Does still have 2-3 BM a week but not taking miralax daily. No hematochezia or melena. She has GERD sx and has rx for pantoprazole but not taking this daily. No n/v/f/c, dysphagia or odynophagia.  At last visit daughter notes pt was stressed due to her Grandson. Was given zoloft rx to help with this but daughter stopped giving this to her since her appetite and stress seems better.  Labs 2/2024 showed iron studies which were stable. Ferritin 74, iron 73.  EGD 3-7-2024 demonstrated no significant abnormalities in the stomach or duodenum Labs from pcp 7/31/24 showed hgb 11.1, hct 33.5, mcv 103.7- she was placed on iron and b12 after this. She has not had iron or ferritin labs in feb. She has appt with pcp tomorrow. She est with a new pcp Dr Ayers with [facility].  10/22/24 After last visit ferritin 106, iron 72. She was told to start iron but this constipated her so she d/c this. Do not have records from pcp showing HUGO. New PCP is dr. quintero  She was seen at [facility] ED 9/17/24 due to abd pain. there had a CT showing Stable minimal proximal intrahepatic biliary duct dilatation and stable mild extrahepatic common bile duct prominence tapering normally to the level the ampulla of Vater likely related to cholecystectomy status and patient's stated age. Correlate clinically and findings could be further characterized by\~MRI/MRCP as clinically warranted. No small bowel dilatation. Scattered colonic diverticulosis without diverticulitis. Minimal fecal loadin throughout the colon. Normal caliber right lower quadrant appendix. Suggested gastric wall thickening. She has MRI on monday her pcp ordered this.  She is not taking miralax so is still contipated. Her pain is RLQ and LUQ. She is on pantoprazole 40mg daily with no heartburn, regurg, n/v/f/c, dysphagia or odynophaiga. She has no BRBPR or melena. Weight is stable and gaining back.  12/18/24 After last visit patient was given Linzess 72 she did not take this yet. Daughter states pt has no complained about being constipated. I asked pt if she has a BM daily and she started she does not. She has one every few days.  MRI 10- demonstrated extrahepatic biliary distention without choledocholithiasis no pancreatic mass, scattered liver cysts, stool retention in the colon, moderate gastric distention no definitive masses, bile duct 12 mm no choledocholithiasis

## 2024-12-19 ENCOUNTER — TELEPHONE ENCOUNTER (OUTPATIENT)
Dept: URBAN - METROPOLITAN AREA CLINIC 92 | Facility: CLINIC | Age: 79
End: 2024-12-19

## 2024-12-19 RX ORDER — LINACLOTIDE 72 UG/1
1 CAPSULE AT LEAST 30 MINUTES BEFORE THE FIRST MEAL OF THE DAY ON AN EMPTY STOMACH CAPSULE, GELATIN COATED ORAL ONCE A DAY
Qty: 90 | Refills: 3 | OUTPATIENT
Start: 2024-12-19 | End: 2025-12-14

## 2024-12-20 ENCOUNTER — TELEPHONE ENCOUNTER (OUTPATIENT)
Dept: URBAN - METROPOLITAN AREA CLINIC 92 | Facility: CLINIC | Age: 79
End: 2024-12-20

## 2025-02-18 ENCOUNTER — OFFICE VISIT (OUTPATIENT)
Dept: URBAN - METROPOLITAN AREA CLINIC 92 | Facility: CLINIC | Age: 80
End: 2025-02-18
Payer: COMMERCIAL

## 2025-02-18 VITALS
BODY MASS INDEX: 23.98 KG/M2 | HEIGHT: 61 IN | DIASTOLIC BLOOD PRESSURE: 73 MMHG | WEIGHT: 127 LBS | HEART RATE: 93 BPM | TEMPERATURE: 97.2 F | SYSTOLIC BLOOD PRESSURE: 114 MMHG

## 2025-02-18 DIAGNOSIS — F43.9 STRESS AT HOME: ICD-10-CM

## 2025-02-18 DIAGNOSIS — R63.4 WEIGHT LOSS: ICD-10-CM

## 2025-02-18 DIAGNOSIS — R10.31 RIGHT LOWER QUADRANT PAIN: ICD-10-CM

## 2025-02-18 DIAGNOSIS — K83.8 DILATED BILE DUCT: ICD-10-CM

## 2025-02-18 DIAGNOSIS — R10.84 GENERALIZED ABDOMINAL PAIN: ICD-10-CM

## 2025-02-18 DIAGNOSIS — K57.92 DIVERTICULITIS: ICD-10-CM

## 2025-02-18 DIAGNOSIS — I70.90 ATHEROSCLEROSIS: ICD-10-CM

## 2025-02-18 DIAGNOSIS — K59.04 CHRONIC IDIOPATHIC CONSTIPATION: ICD-10-CM

## 2025-02-18 PROCEDURE — 99213 OFFICE O/P EST LOW 20 MIN: CPT

## 2025-02-18 RX ORDER — CHOLECALCIFEROL (VITAMIN D3) 25 MCG
1 CAPSULE CAPSULE ORAL ONCE A DAY
Status: ACTIVE | COMMUNITY

## 2025-02-18 RX ORDER — MEGESTROL ACETATE 20 MG/1
1 TABLET TABLET ORAL TWICE A DAY
Status: ACTIVE | COMMUNITY

## 2025-02-18 RX ORDER — CYCLOSPORINE 0.5 MG/ML
INSTILL 1 DROP INTO AFFECTED EYE(S) BY OPHTHALMIC ROUTE 2 TIMES PER DAY EMULSION OPHTHALMIC 2
Qty: 1 | Refills: 0 | Status: ACTIVE | COMMUNITY

## 2025-02-18 RX ORDER — OXYCODONE HYDROCHLORIDE AND ACETAMINOPHEN 7.5; 325 MG/1; MG/1
TABLET ORAL
Qty: 90 TABLET | Status: ACTIVE | COMMUNITY

## 2025-02-18 RX ORDER — GABAPENTIN 100 MG/1
CAPSULE ORAL
Qty: 90 CAPSULE | Status: ON HOLD | COMMUNITY

## 2025-02-18 RX ORDER — LOSARTAN POTASSIUM 50 MG/1
TABLET, FILM COATED ORAL
Qty: 90 TABLET | Status: ACTIVE | COMMUNITY

## 2025-02-18 RX ORDER — CARVEDILOL 3.12 MG/1
TABLET, FILM COATED ORAL
Qty: 180 TABLET | Status: ON HOLD | COMMUNITY

## 2025-02-18 RX ORDER — LEVOTHYROXINE SODIUM 0.07 MG/1
TABLET ORAL
Qty: 90 TABLET | Status: ACTIVE | COMMUNITY

## 2025-02-18 RX ORDER — EZETIMIBE 10 MG/1
1 TABLET TABLET ORAL ONCE A DAY
Status: ACTIVE | COMMUNITY

## 2025-02-18 RX ORDER — RANOLAZINE 1000 MG/1
TABLET, EXTENDED RELEASE ORAL
Qty: 180 TABLET | Status: ACTIVE | COMMUNITY

## 2025-02-18 RX ORDER — DICLOFENAC SODIUM 10 MG/G
GEL TOPICAL
Qty: 100 GRAM | Status: ACTIVE | COMMUNITY

## 2025-02-18 RX ORDER — MULTIVIT-MIN/IRON/FOLIC ACID/K 18-600-40
AS DIRECTED CAPSULE ORAL
Status: ACTIVE | COMMUNITY

## 2025-02-18 RX ORDER — LINACLOTIDE 72 UG/1
1 CAPSULE AT LEAST 30 MINUTES BEFORE THE FIRST MEAL OF THE DAY ON AN EMPTY STOMACH CAPSULE, GELATIN COATED ORAL ONCE A DAY
Qty: 90 | Refills: 3 | Status: ON HOLD | COMMUNITY
Start: 2024-12-19 | End: 2025-12-14

## 2025-02-18 RX ORDER — MEMANTINE HYDROCHLORIDE 10 MG/1
1 TABLET TABLET ORAL ONCE A DAY
Status: ACTIVE | COMMUNITY

## 2025-02-18 NOTE — HPI-TODAY'S VISIT:
8/20/24 80-year-old female presents today for abdominal pain.  She is seen today with her daughter.  She was originally seen 5- complaining of generalized abdominal pain and diarrhea.  After this we obtained a stool study that was negative.  She had CT scan 6/2023 that showed diverticulitis in the descending colon and was sent to Cipro and Flagyl.  We were not sure if patient took a course of medicationso this was resent in November. She had a colonoscopy 11/6/23 that demonstrate diverticulosis in sigmoid, ascending and cecum, 2 benign polyps random biopsy was negative for MC I saw her in December 2023 she was still noting left lower quadrant right lower quadrant pain that was worse after eating.  She also lost around 10 pounds since the last time I saw her.  Due to this we obtained a CT scan in January 2024 that demonstrated cecal diverticulitis.  After this she was sent Augmentin. I sent her to [facility] who stated they do not believe her joellen was from diverticulitis after a neg CT scan.   Due to continued pain we obtained a CT angio  4- demonstrated limited exam due to motion, mild to moderate atherosclerosis NO abdominal aortic aneurysm or dissection, diverticulosis coli apparent mild pericolonic stranding in the ascending colon however this may be artifactual due to motion however mild diverticulitis is not excluded, no fluid collection, distended urinary bladder consider follow-up renal bladder ultrasound no evidence of bowel obstruction or acute appendicitis. Due to abdominal pain patient was given Augmentin and she finished this course.   Her pain is better today. Weight stable. Has gained appetite back. She was started on iron few weeks ago and started to have constipation. Does still have 2-3 BM a week but not taking miralax daily. No hematochezia or melena. She has GERD sx and has rx for pantoprazole but not taking this daily. No n/v/f/c, dysphagia or odynophagia.  At last visit daughter notes pt was stressed due to her Grandson. Was given zoloft rx to help with this but daughter stopped giving this to her since her appetite and stress seems better.  Labs 2/2024 showed iron studies which were stable. Ferritin 74, iron 73.  EGD 3-7-2024 demonstrated no significant abnormalities in the stomach or duodenum Labs from pcp 7/31/24 showed hgb 11.1, hct 33.5, mcv 103.7- she was placed on iron and b12 after this. She has not had iron or ferritin labs in feb. She has appt with pcp tomorrow. She est with a new pcp Dr Ayers with [facility].  10/22/24 After last visit ferritin 106, iron 72. She was told to start iron but this constipated her so she d/c this. Do not have records from pcp showing HUGO. New PCP is dr. quintero  She was seen at [facility] ED 9/17/24 due to abd pain. there had a CT showing Stable minimal proximal intrahepatic biliary duct dilatation and stable mild extrahepatic common bile duct prominence tapering normally to the level the ampulla of Vater likely related to cholecystectomy status and patient's stated age. Correlate clinically and findings could be further characterized by\~MRI/MRCP as clinically warranted. No small bowel dilatation. Scattered colonic diverticulosis without diverticulitis. Minimal fecal loadin throughout the colon. Normal caliber right lower quadrant appendix. Suggested gastric wall thickening. She has MRI on monday her pcp ordered this.  She is not taking miralax so is still contipated. Her pain is RLQ and LUQ. She is on pantoprazole 40mg daily with no heartburn, regurg, n/v/f/c, dysphagia or odynophaiga. She has no BRBPR or melena. Weight is stable and gaining back.  12/18/24 After last visit patient was given Linzess 72 she did not take this yet. Daughter states pt has no complained about being constipated. I asked pt if she has a BM daily and she started she does not. She has one every few days.  MRI 10- demonstrated extrahepatic biliary distention without choledocholithiasis no pancreatic mass, scattered liver cysts, stool retention in the colon, moderate gastric distention no definitive masses, bile duct 12 mm no choledocholithiasis  2/18/25 Doing better. No taking linzess 72mcg and having BM daily. No abd pain.